# Patient Record
Sex: FEMALE | Race: WHITE | Employment: OTHER | URBAN - METROPOLITAN AREA
[De-identification: names, ages, dates, MRNs, and addresses within clinical notes are randomized per-mention and may not be internally consistent; named-entity substitution may affect disease eponyms.]

---

## 2019-04-10 ENCOUNTER — APPOINTMENT (OUTPATIENT)
Dept: CT IMAGING | Age: 59
DRG: 641 | End: 2019-04-10
Payer: MEDICARE

## 2019-04-10 ENCOUNTER — HOSPITAL ENCOUNTER (INPATIENT)
Age: 59
LOS: 2 days | Discharge: HOME | DRG: 641 | End: 2019-04-13
Attending: INTERNAL MEDICINE | Admitting: INTERNAL MEDICINE
Payer: MEDICARE

## 2019-04-10 ENCOUNTER — APPOINTMENT (OUTPATIENT)
Dept: GENERAL RADIOLOGY | Age: 59
DRG: 641 | End: 2019-04-10
Payer: MEDICARE

## 2019-04-10 DIAGNOSIS — K70.31 ALCOHOLIC CIRRHOSIS OF LIVER WITH ASCITES (HCC): ICD-10-CM

## 2019-04-10 DIAGNOSIS — E86.0 DEHYDRATION: ICD-10-CM

## 2019-04-10 DIAGNOSIS — E83.42 HYPOMAGNESEMIA: ICD-10-CM

## 2019-04-10 DIAGNOSIS — K92.1 BLOOD IN STOOL: ICD-10-CM

## 2019-04-10 DIAGNOSIS — F10.11 HISTORY OF ALCOHOL ABUSE: ICD-10-CM

## 2019-04-10 DIAGNOSIS — E87.1 HYPONATREMIA: Primary | ICD-10-CM

## 2019-04-10 LAB
A/G RATIO: 0.8 (ref 1.1–2.2)
ALBUMIN SERPL-MCNC: 3.3 G/DL (ref 3.4–5)
ALP BLD-CCNC: 155 U/L (ref 40–129)
ALT SERPL-CCNC: 29 U/L (ref 10–40)
ANION GAP SERPL CALCULATED.3IONS-SCNC: 15 MMOL/L (ref 3–16)
AST SERPL-CCNC: 142 U/L (ref 15–37)
BASOPHILS ABSOLUTE: 0 K/UL (ref 0–0.2)
BASOPHILS RELATIVE PERCENT: 0.8 %
BILIRUB SERPL-MCNC: 3.2 MG/DL (ref 0–1)
BUN BLDV-MCNC: 7 MG/DL (ref 7–20)
CALCIUM SERPL-MCNC: 8.2 MG/DL (ref 8.3–10.6)
CHLORIDE BLD-SCNC: 81 MMOL/L (ref 99–110)
CO2: 23 MMOL/L (ref 21–32)
CREAT SERPL-MCNC: <0.5 MG/DL (ref 0.6–1.1)
EOSINOPHILS ABSOLUTE: 0 K/UL (ref 0–0.6)
EOSINOPHILS RELATIVE PERCENT: 0.3 %
ETHANOL: 56 MG/DL (ref 0–0.08)
GFR AFRICAN AMERICAN: >60
GFR NON-AFRICAN AMERICAN: >60
GLOBULIN: 4.2 G/DL
GLUCOSE BLD-MCNC: 145 MG/DL (ref 70–99)
HCT VFR BLD CALC: 30.4 % (ref 36–48)
HEMOGLOBIN: 10.5 G/DL (ref 12–16)
LIPASE: 41 U/L (ref 13–60)
LYMPHOCYTES ABSOLUTE: 0.6 K/UL (ref 1–5.1)
LYMPHOCYTES RELATIVE PERCENT: 9.3 %
MAGNESIUM: 1.3 MG/DL (ref 1.8–2.4)
MCH RBC QN AUTO: 35.9 PG (ref 26–34)
MCHC RBC AUTO-ENTMCNC: 34.4 G/DL (ref 31–36)
MCV RBC AUTO: 104.2 FL (ref 80–100)
MONOCYTES ABSOLUTE: 0.3 K/UL (ref 0–1.3)
MONOCYTES RELATIVE PERCENT: 5.5 %
NEUTROPHILS ABSOLUTE: 5.2 K/UL (ref 1.7–7.7)
NEUTROPHILS RELATIVE PERCENT: 84.1 %
OCCULT BLOOD SCREENING: ABNORMAL
PDW BLD-RTO: 15.1 % (ref 12.4–15.4)
PLATELET # BLD: 143 K/UL (ref 135–450)
PMV BLD AUTO: 7.7 FL (ref 5–10.5)
POTASSIUM SERPL-SCNC: 3.9 MMOL/L (ref 3.5–5.1)
PRO-BNP: 93 PG/ML (ref 0–124)
RBC # BLD: 2.92 M/UL (ref 4–5.2)
SODIUM BLD-SCNC: 119 MMOL/L (ref 136–145)
SPECIMEN STATUS: NORMAL
TOTAL PROTEIN: 7.5 G/DL (ref 6.4–8.2)
TROPONIN: <0.01 NG/ML
WBC # BLD: 6.2 K/UL (ref 4–11)

## 2019-04-10 PROCEDURE — 80053 COMPREHEN METABOLIC PANEL: CPT

## 2019-04-10 PROCEDURE — 96367 TX/PROPH/DG ADDL SEQ IV INF: CPT

## 2019-04-10 PROCEDURE — 74177 CT ABD & PELVIS W/CONTRAST: CPT

## 2019-04-10 PROCEDURE — 84484 ASSAY OF TROPONIN QUANT: CPT

## 2019-04-10 PROCEDURE — 96365 THER/PROPH/DIAG IV INF INIT: CPT

## 2019-04-10 PROCEDURE — 6360000002 HC RX W HCPCS: Performed by: PHYSICIAN ASSISTANT

## 2019-04-10 PROCEDURE — 83735 ASSAY OF MAGNESIUM: CPT

## 2019-04-10 PROCEDURE — G0480 DRUG TEST DEF 1-7 CLASSES: HCPCS

## 2019-04-10 PROCEDURE — 36415 COLL VENOUS BLD VENIPUNCTURE: CPT

## 2019-04-10 PROCEDURE — 2500000003 HC RX 250 WO HCPCS: Performed by: PHYSICIAN ASSISTANT

## 2019-04-10 PROCEDURE — 93005 ELECTROCARDIOGRAM TRACING: CPT | Performed by: INTERNAL MEDICINE

## 2019-04-10 PROCEDURE — 83690 ASSAY OF LIPASE: CPT

## 2019-04-10 PROCEDURE — 6360000004 HC RX CONTRAST MEDICATION: Performed by: PHYSICIAN ASSISTANT

## 2019-04-10 PROCEDURE — 2580000003 HC RX 258: Performed by: PHYSICIAN ASSISTANT

## 2019-04-10 PROCEDURE — 99285 EMERGENCY DEPT VISIT HI MDM: CPT

## 2019-04-10 PROCEDURE — G0328 FECAL BLOOD SCRN IMMUNOASSAY: HCPCS

## 2019-04-10 PROCEDURE — 71046 X-RAY EXAM CHEST 2 VIEWS: CPT

## 2019-04-10 PROCEDURE — 85025 COMPLETE CBC W/AUTO DIFF WBC: CPT

## 2019-04-10 PROCEDURE — 96375 TX/PRO/DX INJ NEW DRUG ADDON: CPT

## 2019-04-10 PROCEDURE — 83880 ASSAY OF NATRIURETIC PEPTIDE: CPT

## 2019-04-10 RX ORDER — ONDANSETRON 2 MG/ML
4 INJECTION INTRAMUSCULAR; INTRAVENOUS ONCE
Status: COMPLETED | OUTPATIENT
Start: 2019-04-10 | End: 2019-04-10

## 2019-04-10 RX ORDER — 0.9 % SODIUM CHLORIDE 0.9 %
1000 INTRAVENOUS SOLUTION INTRAVENOUS ONCE
Status: COMPLETED | OUTPATIENT
Start: 2019-04-11 | End: 2019-04-11

## 2019-04-10 RX ORDER — MAGNESIUM SULFATE IN WATER 40 MG/ML
2 INJECTION, SOLUTION INTRAVENOUS ONCE
Status: COMPLETED | OUTPATIENT
Start: 2019-04-10 | End: 2019-04-11

## 2019-04-10 RX ADMIN — FOLIC ACID: 5 INJECTION, SOLUTION INTRAMUSCULAR; INTRAVENOUS; SUBCUTANEOUS at 23:06

## 2019-04-10 RX ADMIN — FAMOTIDINE 20 MG: 10 INJECTION, SOLUTION INTRAVENOUS at 23:48

## 2019-04-10 RX ADMIN — ONDANSETRON 4 MG: 2 INJECTION INTRAMUSCULAR; INTRAVENOUS at 22:22

## 2019-04-10 RX ADMIN — MAGNESIUM SULFATE HEPTAHYDRATE 2 G: 40 INJECTION, SOLUTION INTRAVENOUS at 23:48

## 2019-04-10 RX ADMIN — IOPAMIDOL 75 ML: 755 INJECTION, SOLUTION INTRAVENOUS at 22:30

## 2019-04-10 ASSESSMENT — PAIN DESCRIPTION - LOCATION: LOCATION: ABDOMEN

## 2019-04-10 ASSESSMENT — PAIN SCALES - GENERAL: PAINLEVEL_OUTOF10: 8

## 2019-04-10 ASSESSMENT — PAIN DESCRIPTION - PAIN TYPE: TYPE: ACUTE PAIN

## 2019-04-11 LAB
AMMONIA: 43 UMOL/L (ref 11–51)
ANION GAP SERPL CALCULATED.3IONS-SCNC: 11 MMOL/L (ref 3–16)
ANION GAP SERPL CALCULATED.3IONS-SCNC: 12 MMOL/L (ref 3–16)
BILIRUBIN URINE: NEGATIVE
BLOOD, URINE: ABNORMAL
BUN BLDV-MCNC: 6 MG/DL (ref 7–20)
BUN BLDV-MCNC: 6 MG/DL (ref 7–20)
CALCIUM SERPL-MCNC: 7.4 MG/DL (ref 8.3–10.6)
CALCIUM SERPL-MCNC: 7.6 MG/DL (ref 8.3–10.6)
CHLORIDE BLD-SCNC: 85 MMOL/L (ref 99–110)
CHLORIDE BLD-SCNC: 85 MMOL/L (ref 99–110)
CLARITY: CLEAR
CO2: 25 MMOL/L (ref 21–32)
CO2: 25 MMOL/L (ref 21–32)
COLOR: YELLOW
CREAT SERPL-MCNC: <0.5 MG/DL (ref 0.6–1.1)
CREAT SERPL-MCNC: <0.5 MG/DL (ref 0.6–1.1)
EKG ATRIAL RATE: 113 BPM
EKG DIAGNOSIS: NORMAL
EKG P AXIS: 60 DEGREES
EKG P-R INTERVAL: 138 MS
EKG Q-T INTERVAL: 340 MS
EKG QRS DURATION: 70 MS
EKG QTC CALCULATION (BAZETT): 466 MS
EKG R AXIS: 46 DEGREES
EKG T AXIS: 31 DEGREES
EKG VENTRICULAR RATE: 113 BPM
EPITHELIAL CELLS, UA: ABNORMAL /HPF
GFR AFRICAN AMERICAN: >60
GFR AFRICAN AMERICAN: >60
GFR NON-AFRICAN AMERICAN: >60
GFR NON-AFRICAN AMERICAN: >60
GLUCOSE BLD-MCNC: 103 MG/DL (ref 70–99)
GLUCOSE BLD-MCNC: 115 MG/DL (ref 70–99)
GLUCOSE URINE: NEGATIVE MG/DL
HCT VFR BLD CALC: 24.9 % (ref 36–48)
HEMOGLOBIN: 8.8 G/DL (ref 12–16)
KETONES, URINE: NEGATIVE MG/DL
LEUKOCYTE ESTERASE, URINE: NEGATIVE
MAGNESIUM: 1.5 MG/DL (ref 1.8–2.4)
MCH RBC QN AUTO: 36.6 PG (ref 26–34)
MCHC RBC AUTO-ENTMCNC: 35.4 G/DL (ref 31–36)
MCV RBC AUTO: 103.4 FL (ref 80–100)
MICROSCOPIC EXAMINATION: YES
NITRITE, URINE: NEGATIVE
PDW BLD-RTO: 15.4 % (ref 12.4–15.4)
PH UA: 6 (ref 5–8)
PLATELET # BLD: 127 K/UL (ref 135–450)
PMV BLD AUTO: 8 FL (ref 5–10.5)
POTASSIUM REFLEX MAGNESIUM: 3.4 MMOL/L (ref 3.5–5.1)
POTASSIUM SERPL-SCNC: 3.4 MMOL/L (ref 3.5–5.1)
POTASSIUM SERPL-SCNC: 3.5 MMOL/L (ref 3.5–5.1)
PROTEIN UA: NEGATIVE MG/DL
RBC # BLD: 2.41 M/UL (ref 4–5.2)
RBC UA: ABNORMAL /HPF (ref 0–2)
SODIUM BLD-SCNC: 121 MMOL/L (ref 136–145)
SODIUM BLD-SCNC: 122 MMOL/L (ref 136–145)
SODIUM URINE: <20 MMOL/L
SPECIFIC GRAVITY UA: 1.01 (ref 1–1.03)
URINE TYPE: ABNORMAL
UROBILINOGEN, URINE: 2 E.U./DL
WBC # BLD: 4.6 K/UL (ref 4–11)
WBC UA: ABNORMAL /HPF (ref 0–5)

## 2019-04-11 PROCEDURE — 6370000000 HC RX 637 (ALT 250 FOR IP): Performed by: INTERNAL MEDICINE

## 2019-04-11 PROCEDURE — 1200000000 HC SEMI PRIVATE

## 2019-04-11 PROCEDURE — 6360000002 HC RX W HCPCS: Performed by: PHYSICIAN ASSISTANT

## 2019-04-11 PROCEDURE — 96366 THER/PROPH/DIAG IV INF ADDON: CPT

## 2019-04-11 PROCEDURE — 2580000003 HC RX 258: Performed by: INTERNAL MEDICINE

## 2019-04-11 PROCEDURE — 82140 ASSAY OF AMMONIA: CPT

## 2019-04-11 PROCEDURE — 36415 COLL VENOUS BLD VENIPUNCTURE: CPT

## 2019-04-11 PROCEDURE — 2700000000 HC OXYGEN THERAPY PER DAY

## 2019-04-11 PROCEDURE — 84300 ASSAY OF URINE SODIUM: CPT

## 2019-04-11 PROCEDURE — 80048 BASIC METABOLIC PNL TOTAL CA: CPT

## 2019-04-11 PROCEDURE — 2580000003 HC RX 258: Performed by: PHYSICIAN ASSISTANT

## 2019-04-11 PROCEDURE — 83735 ASSAY OF MAGNESIUM: CPT

## 2019-04-11 PROCEDURE — 83935 ASSAY OF URINE OSMOLALITY: CPT

## 2019-04-11 PROCEDURE — 81001 URINALYSIS AUTO W/SCOPE: CPT

## 2019-04-11 PROCEDURE — 6360000002 HC RX W HCPCS: Performed by: INTERNAL MEDICINE

## 2019-04-11 PROCEDURE — C9113 INJ PANTOPRAZOLE SODIUM, VIA: HCPCS | Performed by: PHYSICIAN ASSISTANT

## 2019-04-11 PROCEDURE — 93010 ELECTROCARDIOGRAM REPORT: CPT | Performed by: INTERNAL MEDICINE

## 2019-04-11 PROCEDURE — 85027 COMPLETE CBC AUTOMATED: CPT

## 2019-04-11 PROCEDURE — 94761 N-INVAS EAR/PLS OXIMETRY MLT: CPT

## 2019-04-11 PROCEDURE — 99406 BEHAV CHNG SMOKING 3-10 MIN: CPT

## 2019-04-11 PROCEDURE — 94664 DEMO&/EVAL PT USE INHALER: CPT

## 2019-04-11 RX ORDER — SODIUM CHLORIDE 9 MG/ML
INJECTION, SOLUTION INTRAVENOUS CONTINUOUS
Status: DISCONTINUED | OUTPATIENT
Start: 2019-04-11 | End: 2019-04-11

## 2019-04-11 RX ORDER — SODIUM CHLORIDE 0.9 % (FLUSH) 0.9 %
10 SYRINGE (ML) INJECTION EVERY 12 HOURS SCHEDULED
Status: DISCONTINUED | OUTPATIENT
Start: 2019-04-11 | End: 2019-04-13 | Stop reason: HOSPADM

## 2019-04-11 RX ORDER — ALBUTEROL SULFATE 90 UG/1
2 AEROSOL, METERED RESPIRATORY (INHALATION) EVERY 6 HOURS PRN
COMMUNITY

## 2019-04-11 RX ORDER — HYDROCODONE BITARTRATE AND ACETAMINOPHEN 5; 325 MG/1; MG/1
1 TABLET ORAL EVERY 4 HOURS PRN
Status: DISCONTINUED | OUTPATIENT
Start: 2019-04-11 | End: 2019-04-13 | Stop reason: HOSPADM

## 2019-04-11 RX ORDER — SODIUM CHLORIDE 0.9 % (FLUSH) 0.9 %
10 SYRINGE (ML) INJECTION PRN
Status: DISCONTINUED | OUTPATIENT
Start: 2019-04-11 | End: 2019-04-13 | Stop reason: HOSPADM

## 2019-04-11 RX ORDER — ONDANSETRON 2 MG/ML
4 INJECTION INTRAMUSCULAR; INTRAVENOUS EVERY 6 HOURS PRN
Status: DISCONTINUED | OUTPATIENT
Start: 2019-04-11 | End: 2019-04-13 | Stop reason: HOSPADM

## 2019-04-11 RX ORDER — 0.9 % SODIUM CHLORIDE 0.9 %
10 VIAL (ML) INJECTION PRN
Status: DISCONTINUED | OUTPATIENT
Start: 2019-04-11 | End: 2019-04-13 | Stop reason: HOSPADM

## 2019-04-11 RX ORDER — OCTREOTIDE ACETATE 100 UG/ML
50 INJECTION, SOLUTION INTRAVENOUS; SUBCUTANEOUS ONCE
Status: COMPLETED | OUTPATIENT
Start: 2019-04-11 | End: 2019-04-11

## 2019-04-11 RX ORDER — POTASSIUM CHLORIDE 20 MEQ/1
20 TABLET, EXTENDED RELEASE ORAL 2 TIMES DAILY
Status: COMPLETED | OUTPATIENT
Start: 2019-04-11 | End: 2019-04-12

## 2019-04-11 RX ADMIN — HYDROCODONE BITARTRATE AND ACETAMINOPHEN 1 TABLET: 5; 325 TABLET ORAL at 20:19

## 2019-04-11 RX ADMIN — SODIUM CHLORIDE 8 MG/HR: 9 INJECTION, SOLUTION INTRAVENOUS at 16:22

## 2019-04-11 RX ADMIN — POTASSIUM CHLORIDE 20 MEQ: 20 TABLET, EXTENDED RELEASE ORAL at 20:19

## 2019-04-11 RX ADMIN — HYDROCODONE BITARTRATE AND ACETAMINOPHEN 1 TABLET: 5; 325 TABLET ORAL at 08:49

## 2019-04-11 RX ADMIN — OCTREOTIDE ACETATE 50 MCG: 100 INJECTION, SOLUTION INTRAVENOUS; SUBCUTANEOUS at 06:02

## 2019-04-11 RX ADMIN — OCTREOTIDE ACETATE 50 MCG/HR: 500 INJECTION, SOLUTION INTRAVENOUS; SUBCUTANEOUS at 16:22

## 2019-04-11 RX ADMIN — POTASSIUM CHLORIDE 20 MEQ: 20 TABLET, EXTENDED RELEASE ORAL at 11:59

## 2019-04-11 RX ADMIN — HYDROCODONE BITARTRATE AND ACETAMINOPHEN 1 TABLET: 5; 325 TABLET ORAL at 04:28

## 2019-04-11 RX ADMIN — SODIUM CHLORIDE 1000 ML: 9 INJECTION, SOLUTION INTRAVENOUS at 01:56

## 2019-04-11 RX ADMIN — SODIUM CHLORIDE: 9 INJECTION, SOLUTION INTRAVENOUS at 04:28

## 2019-04-11 RX ADMIN — SODIUM CHLORIDE 8 MG/HR: 9 INJECTION, SOLUTION INTRAVENOUS at 02:27

## 2019-04-11 RX ADMIN — HYDROCODONE BITARTRATE AND ACETAMINOPHEN 1 TABLET: 5; 325 TABLET ORAL at 13:29

## 2019-04-11 RX ADMIN — SODIUM CHLORIDE 80 MG: 9 INJECTION, SOLUTION INTRAVENOUS at 01:58

## 2019-04-11 RX ADMIN — CEFTRIAXONE SODIUM 1 G: 1 INJECTION, POWDER, FOR SOLUTION INTRAMUSCULAR; INTRAVENOUS at 06:29

## 2019-04-11 RX ADMIN — Medication 10 ML: at 08:49

## 2019-04-11 RX ADMIN — OCTREOTIDE ACETATE 50 MCG/HR: 500 INJECTION, SOLUTION INTRAVENOUS; SUBCUTANEOUS at 06:03

## 2019-04-11 RX ADMIN — ONDANSETRON 4 MG: 2 INJECTION INTRAMUSCULAR; INTRAVENOUS at 04:33

## 2019-04-11 ASSESSMENT — PAIN DESCRIPTION - PROGRESSION
CLINICAL_PROGRESSION: NOT CHANGED

## 2019-04-11 ASSESSMENT — PAIN SCALES - GENERAL
PAINLEVEL_OUTOF10: 9
PAINLEVEL_OUTOF10: 5
PAINLEVEL_OUTOF10: 7
PAINLEVEL_OUTOF10: 8
PAINLEVEL_OUTOF10: 9
PAINLEVEL_OUTOF10: 8

## 2019-04-11 ASSESSMENT — PAIN DESCRIPTION - LOCATION
LOCATION: BACK;ABDOMEN

## 2019-04-11 ASSESSMENT — PAIN DESCRIPTION - PAIN TYPE
TYPE: ACUTE PAIN

## 2019-04-11 ASSESSMENT — PAIN DESCRIPTION - DESCRIPTORS
DESCRIPTORS: ACHING;CRAMPING;DISCOMFORT
DESCRIPTORS: ACHING;CRAMPING

## 2019-04-11 ASSESSMENT — PAIN DESCRIPTION - FREQUENCY
FREQUENCY: CONTINUOUS
FREQUENCY: CONTINUOUS

## 2019-04-11 ASSESSMENT — PAIN DESCRIPTION - ONSET
ONSET: ON-GOING
ONSET: ON-GOING

## 2019-04-11 ASSESSMENT — PAIN - FUNCTIONAL ASSESSMENT: PAIN_FUNCTIONAL_ASSESSMENT: 0-10

## 2019-04-11 NOTE — CONSULTS
Thank you to requesting provider: Dr. Olivia Closs, for asking us to see Lizeth Becker  Reason for consultation:   Hyponatremia   Chief Complaint:  Abdominal pain    History of Presenting Illness      59 y/o female admitted with abdominal pain. She reports this has been going on for 6 weeks and she had not been able to keep much down. She has gained weight and has abdominal distention. She says she is not aware that she has any liver disease. We have been asked to see her for hyponatremia. Review of labs indicates that her sodium has been intermittently low for many years, mostly in the 125 to 131 range. She does have edema. Has been having black diarrhea as well. Now her sodium is 122, she is alert.          Past Medical/Surgical History      Active Ambulatory Problems     Diagnosis Date Noted    Acute pancreatitis 04/03/2014    N&V (nausea and vomiting) 04/04/2014    Abdominal pain 04/04/2014    Transaminitis 04/05/2014    Rash, drug 04/05/2014    Hyponatremia 04/05/2014    Chronic hepatitis C virus infection ([facility] [address] 11/22/2015    Hypertension 10/02/2013    Tobacco dependence syndrome 10/02/2013     Resolved Ambulatory Problems     Diagnosis Date Noted    No Resolved Ambulatory Problems     Past Medical History:   Diagnosis Date    Abdominal pain     Carpal tunnel syndrome     Hepatitis C     Hepatitis C     Hypertension     Pancreatitis     Scoliosis     SVT (supraventricular tachycardia) (HCC)          Review of Systems     Constitutional:  No weight loss, no fever/chills  Eyes:  No eye pain, no eye redness  Cardiovascular:  No chest pain, + worsening of edema  Respiratory:  No hemoptysis, no stridor  Gastrointestinal:  + blood in stool, + n/v, + diarrhea  Genitoruinary:  No hematuria, no difficulty with urination  Musculoskeletal:  No joint swelling, no redness  Integumentary:  No Rash, no itching   Neurological:  No focal weakness, No new sensory deficit  Psychiatric:  No depression, no confusion  Endocrine:  No polyuria, no polydipsia     Medications      Reviewed in EMR     Allergies     Lorazepam and Morphine      Family History       Negative for Kidney Disease    Social History      Social History     Socioeconomic History    Marital status: Single     Spouse name: None    Number of children: None    Years of education: None    Highest education level: None   Occupational History    None   Social Needs    Financial resource strain: None    Food insecurity:     Worry: None     Inability: None    Transportation needs:     Medical: None     Non-medical: None   Tobacco Use    Smoking status: Current Every Day Smoker     Packs/day: 0.25     Years: 1.00     Pack years: 0.25     Types: Cigarettes    Tobacco comment: only smokes 4 a day   Substance and Sexual Activity    Alcohol use: Yes     Comment: 3 drinks per day    Drug use: No     Frequency: 3.0 times per week    Sexual activity: None   Lifestyle    Physical activity:     Days per week: None     Minutes per session: None    Stress: None   Relationships    Social connections:     Talks on phone: None     Gets together: None     Attends [employer] service: None     Active member of club or organization: None     Attends meetings of clubs or organizations: None     Relationship status: None    Intimate partner violence:     Fear of current or ex partner: None     Emotionally abused: None     Physically abused: None     Forced sexual activity: None   Other Topics Concern    None   Social History Narrative    None       Physical Exam     Blood pressure (!) 82/49, pulse 108, temperature 98.1 °F (36.7 °C), temperature source Oral, resp. rate 16, height 5' 5\" (1.651 m), weight 118 lb (53.5 kg), SpO2 98 %.     General:  NAD, A+Ox3, ill-appearing  HEENT:  PERRL, EOMI  Neck:  Supple, normal range of movement  Chest:  CTAB, good respiratory effort, good air movement  CV:  Regular, no rub   Abdomen:  NTND, soft, +BS, no hepatosplenomegaly, +

## 2019-04-11 NOTE — PROGRESS NOTES
Patient admitted to room 212 from Emergency Department. Patient oriented to room, call light, bed rails, phone, lights and bathroom. Patient instructed about the schedule of the day including: vital sign frequency, lab draws, possible tests, frequency of MD and staff rounds, including RN/MD rounding together at bedside, daily weights, and I &O's. Patient instructed about prescribed diet, how to use 8MENU, and television. Bed alarm in place, patient aware of placement and reason. Telemetry box #68 in place, patient aware of placement and reason. Bed locked, in lowest position, side rails up 2/4, call light within reach. Will continue to monitor.

## 2019-04-11 NOTE — PROGRESS NOTES
Spoke with GI consulting physician Gustavo Quintana who advised that patient should be NPO in preparation for EGD. Discussed new medication orders which include Rocephin IV q24, octreotide bolus, octreotide continuous infusion.

## 2019-04-11 NOTE — PROGRESS NOTES
Perfect Serve to Dr. Argueta Commander:    Should GI also be consulted? N/V with occult positive. H/H 10.5/30.4 Please advise. Thank you.     Electronically signed by Joel Vargas RN on 4/11/2019 at 2:23 AM     Response: GI consult ordered    Electronically signed by Joel Vargas RN on 4/11/2019 at 3:03 AM

## 2019-04-11 NOTE — CARE COORDINATION
CASE MANAGEMENT INITIAL ASSESSMENT      Reviewed chart and met with patient today, re: discharge planning   Explained Case Management role/services. Family present: motherGayle   Primary contact information: motherGayle [phone]    Admit date/status: 4/10/19 Inpatient  Diagnosis: Hyponatremia    Insurance: Medicare, Medicaid secondary   Precert required for SNF - N        3 night stay required - Y    Living arrangements, Adls, care needs, prior to admission: Pt lives with parents, her daughter, and her four grandchildren. Independent although has debilitating back pain, usually drinks 2-3 beers/night, does not have pain medicine. Transportation: pt to arrange with family     PT/OT recs: not ordered    Barriers to discharge: none noted    Plan/comments: Pt's mother denies any discharge needs for pt. Please notify DCP if needs arise.   BRIJESH Arreola-RN

## 2019-04-11 NOTE — PLAN OF CARE
Problem: Falls - Risk of:  Goal: Will remain free from falls  Description  Will remain free from falls  Outcome: Ongoing  Note:   Pt will call out to staff before getting up and ambulating by self. Alarm in place, bed locked and in lowest position, fall socks on and in place. Will continue to monitor.

## 2019-04-11 NOTE — H&P
Hospital Medicine History & Physical      PCP: Landry Ellington MD    Date of Admission: 4/10/2019    Date of Service: Pt seen/examined on 04/11/19   and Admitted to Inpatient with expected LOS greater than two midnights due to medical therapy. Chief Complaint   Patient presents with    Illness     \"Has been sick for six weeks. \"  States she feel very weak and has been having black-colored diarrhea. States her feet and abdomen are swollen. C/O abd/back pain. History Of Present Illness: 60 y.o. female who hasn't been following with her PCP for the last 1.5 year  presented to [facility] ED  with her mom with C/o 6 week  history of upper abdominal pain with nausea and vomiting. Patient reports that she drinks approximately 6 beers per day. States that for past 6 weeks she has had some upper abdominal discomfort with nausea and vomiting. For the last few days her Symptoms have worsened and so presented to ED for further E/M . Denies headache, lightheadedness, dizziness or confusion. Upper abdominal pain worsening. She has N/V x 1 but intermittently had diarrhea . Also had bright some  coffee-ground emesis. Has had some diarrhea which has been black. No constipation. Denies fevers chills. No chest pain shortness of breath. No urinary symptoms. No vaginal complaints. Reports that she has no history of alcohol withdrawal.  She has not however attempted cessation in many years. No history of cirrhosis. Mother reports that she does appear yellow. ED Course : Upon arrival to ED , she was found to be tachycardic and normotensive  . Labs showed severe hyponatremia with Na -119 and hypomagnesemia . Her Hb 10.5 with macrocytosis and positive Occult blood . CT A/P s/o Hepatic Cirrhosis with Moderate ascites w/o splenomegaly . She received 1 L fluid bolus in ED and was admitted to the hospital for further E/M. Nephrology and GI were consulted .      Upon evaluation by me this AM, Patient was already evaluated by Nephrology and fluids were D/alberto and recommended to consider Tolvaptan PRN . Gi evaluated with plans for possible EGD once hyponatremia improves . She was also  On Octreotide and Protonix gtt     Past Medical History:      Diagnosis Date    Abdominal pain     in ER Wed. 11/2  for Abdominal pain stool/showed blood.  Carpal tunnel syndrome     Hepatitis C     Hepatitis C     Hypertension     Pancreatitis     Scoliosis     SVT (supraventricular tachycardia) (HCC)        Past Surgical History:        Procedure Laterality Date    APPENDECTOMY      BACK SURGERY      r/t scoliosis    COLONOSCOPY      COLONOSCOPY  11/11    polyp    COLONOSCOPY  27 Jan 2016    polyps    ENDOSCOPIC ULTRASOUND (UPPER)  08/24/2016    Enlarged Pancreas    EYE SURGERY Right 2/5/15    Cataract removal    TUBAL LIGATION      UPPER GASTROINTESTINAL ENDOSCOPY  11/4/11    bx distal esophagus       Medications Prior to Admission:    Prior to Admission medications    Medication Sig Start Date End Date Taking? Authorizing Provider   albuterol sulfate  (90 Base) MCG/ACT inhaler Inhale 2 puffs into the lungs every 6 hours as needed for Wheezing   Yes Historical Provider, MD   Multiple Vitamins-Minerals (THERAPEUTIC MULTIVITAMIN-MINERALS) tablet Take 1 tablet by mouth daily. Historical Provider, MD       Allergies:  Lorazepam and Morphine    Social History:    The patient currently lives with her mother and is independent of IADLs     TOBACCO:   reports that she has been smoking cigarettes. She has a 0.25 pack-year smoking history. She does not have any smokeless tobacco history on file. ETOH:   reports that she drinks alcohol. Family History:     Reviewed in detail and negative for DM, CAD, Cancer, CVA.  Positive as follows:        Problem Relation Age of Onset    Cancer Father         small cell    Cancer Brother         small cell    Arthritis Mother     High Blood Pressure Mother REVIEW OF SYSTEMS:   Pertinent positives as noted in the HPI. All other systems reviewed and negative. PHYSICAL EXAM PERFORMED:  BP (!) 82/49 Comment: MD aware  Pulse 108   Temp 98.1 °F (36.7 °C) (Oral)   Resp 16   Ht 5' 5\" (1.651 m)   Wt 118 lb (53.5 kg)   SpO2 98%   BMI 19.64 kg/m²     General appearance:  No apparent distress, appears stated age and cooperative. HEENT:  Normal cephalic, atraumatic without obvious deformity. Pupils equal, round, and reactive to light. Extra ocular muscles intact. Conjunctivae/corneas clear. Neck: Supple, with full range of motion. No jugular venous distention. Trachea midline. Respiratory:  Normal respiratory effort. Clear to auscultation, bilaterally without Rales/Wheezes/Rhonchi. Cardiovascular:  Regular rate and rhythm with normal S1/S2 without murmurs, rubs or gallops. Abdomen: Soft, mildly tender, mildly -distended with normal bowel sounds. Musculoskeletal:  No clubbing, cyanosis or edema bilaterally. Full range of motion without deformity. Skin: Skin color, texture, turgor normal.  No rashes or lesions. Neurologic:  Neurovascularly intact without any focal sensory/motor deficits. Cranial nerves: II-XII intact, grossly non-focal.  Psychiatric:  Alert and oriented, thought content appropriate, normal insight  Capillary Refill: Brisk,< 3 seconds   Peripheral Pulses: +2 palpable, equal bilaterally       Labs:   Recent Labs     04/10/19  2017 04/11/19  0759   WBC 6.2 4.6   HGB 10.5* 8.8*   HCT 30.4* 24.9*    127*     Recent Labs     04/10/19  2017 04/11/19  0800   * 122*   K 3.9 3.4*  3.4*   CL 81* 85*   CO2 23 25   BUN 7 6*   CREATININE <0.5* <0.5*   CALCIUM 8.2* 7.4*     Recent Labs     04/10/19  2017   *   ALT 29   BILITOT 3.2*   ALKPHOS 155*     No results for input(s): INR in the last 72 hours.   Recent Labs     04/10/19  2017   TROPONINI <0.01       Urinalysis:    Lab Results   Component Value Date    NITRU Negative 04/11/2019 WBCUA 0-2 04/11/2019    RBCUA 3-5 04/11/2019    BLOODU SMALL 04/11/2019    SPECGRAV 1.010 04/11/2019    GLUCOSEU Negative 04/11/2019       EKG:  I have reviewed the EKG with the following interpretation: ST with PVCs, non specific ST/T changes     Radiology:    I have reviewed the Imaging  with the following interpretation:   CT ABDOMEN PELVIS W IV CONTRAST Additional Contrast? None   Final Result   1. Liver findings suggest cirrhosis. 2. Moderate ascites without splenomegaly. 3. Colonic wall thickening is nonspecific in the setting of cirrhosis and   ascites though colitis must be considered. XR CHEST STANDARD (2 VW)   Final Result   Linear opacification seen at the lung bases bilaterally could represent   atelectasis or pneumonia      Hiatal hernia again noted               Active Hospital Problems    Diagnosis Date Noted    Hyponatremia [E87.1] 04/05/2014       ASSESSMENT/PLAN:  1. Acute on Chronic Hyponatremia likely due to [facility] [place] potomania in the setting of Alcoholic Liver disease   - Na 119 on admission ; Currently upto 122 this AM after IV fluids ; Nephrology evaluated and recommended D/c IV fludis and monitor for now . Consider tolvaptan PRN . 2. Anemia in the setting of Alcoholic Liver disease and Upper GI bleed   - FOBT positive ; Started on Octreotide and Protonix gtt in ED - Continue   - Monitor H/H and plan for PRBC transfusion for Hb < 7   - GI consulted from ED -Evaluated with plans to do EGD tomorrow     3. Abdominal Pain with Moderates Ascites on CT - Normal WBC and no signs of infection - Will d/w GI and arrange for US guided Paracentesis . On exam abdomen not significantly distended  - On Empiric Rocephin     4.  Chronic Low back pain with Hx of Hardware in her back - On Norco Prn - Continue       DVT Prophylaxis: SCD given GI bleed   Diet: DIET CLEAR LIQUID;  Diet NPO, After Midnight  Code Status: Full Code    PT/OT Eval Status: Not yet ordered     Dispo - Anticipate > 2 MN stay in the hospital      Keysha Culp MD    The note was completed using Dragon -speech recognition software & EMR  . Every effort was made to ensure accuracy; however, inadvertent computerized transcription errors may be present. Thank you Bill Gannon MD for the opportunity to be involved in this patient's care. If you have any questions or concerns please feel free to contact me at [phone].

## 2019-04-11 NOTE — PROGRESS NOTES
Perfect Serve to Dr. Pretty Khan:    Please call. Need to clarify orders regarding sodium levels. Electronically signed by Mikayla Carranza RN on 4/11/2019 at 2:09 AM     Response: Continue to run 1000 mL/hr of 1LNS bolus. Nephrology to be consulted. Norco ordered for pain management.     Electronically signed by Mikayla Carranza RN on 4/11/2019 at 2:23 AM

## 2019-04-11 NOTE — ED NOTES
Pt to er with complaints of abdominal pain for one week. Ivania Dubin states she has had vomiting and dark colored diarrhea. . Pt's abdomin is noticablely swollen and pt states it has been that way for a few days. . Pt states that she does drink a few beers everyday and has a history of pancreatitis         Rayma Leventhal, RN  04/10/19 2055

## 2019-04-11 NOTE — PROGRESS NOTES
4 Eyes Skin Assessment     The patient is being assess for  Admission    I agree that 2 RN's have performed a thorough Head to Toe Skin Assessment on the patient. ALL assessment sites listed below have been assessed. Areas assessed by both nurses: Ganesh Danger  [x]   Head, Face, and Ears   [x]   Shoulders, Back, and Chest  [x]   Arms, Elbows, and Hands   [x]   Coccyx, Sacrum, and Ischum  [x]   Legs, Feet, and Heels        Does the Patient have Skin Breakdown?   No         Kuldeep Prevention initiated:  No   Wound Care Orders initiated:  No      [facility] nurse consulted for Pressure Injury (Stage 3,4, Unstageable, DTI, NWPT, and Complex wounds):  No      Nurse 1 eSignature: Electronically signed by Floridalma Ramírez RN on 4/11/19 at 2:02 AM    **SHARE this note so that the co-signing nurse is able to place an eSignature**    Nurse 2 eSignature: Electronically signed by Marley Dailey RN on 4/11/19 at 4:44 AM

## 2019-04-11 NOTE — CONSULTS
Gastroenterology Consult Note    Patient:   Zoraida Castro   YOB: 1960   Facility:   [facility]   Referring/PCP: Alicia Javier MD  Date:     4/11/2019  Consultant:   Melody Bertrand MD    Subjective: This 60 y.o. female was admitted 4/10/2019 with a diagnosis of \"Hyponatremia [E87.1]  Hyponatremia [E87. 1]\" and is seen in consultation regarding \"N/V\". Information was obtained from interview of  the patient, examination of the patient, and review of records. I did  update the past medical, surgical, social and / or family history. N/V moderate in UGI tact for 2 weeks assoc w anemia    Current status  Present  Diet Order: Diet NPO Effective Now and she is not tolerating diet. Recently, she has experienced no abdominal  Pain and she has not required Intravenous narcotic analgesics. The patient has also experienced no constipation, diarrhea, fever, hematochezia and melena      Prior to Admission medications    Medication Sig Start Date End Date Taking? Authorizing Provider   albuterol sulfate  (90 Base) MCG/ACT inhaler Inhale 2 puffs into the lungs every 6 hours as needed for Wheezing   Yes Historical Provider, MD   Multiple Vitamins-Minerals (THERAPEUTIC MULTIVITAMIN-MINERALS) tablet Take 1 tablet by mouth daily. Historical Provider, MD      Scheduled Medications:    sodium chloride flush  10 mL Intravenous 2 times per day    cefTRIAXone (ROCEPHIN) IV  1 g Intravenous Q24H     Infusions:    sodium chloride 75 mL/hr at 04/11/19 0428    octreotide (SANDOSTATIN) infusion 50 mcg/hr (04/11/19 0603)    pantoprozole (PROTONIX) infusion 8 mg/hr (04/11/19 0227)     PRN Medications: sodium chloride flush, magnesium hydroxide, ondansetron, HYDROcodone 5 mg - acetaminophen, sodium chloride (PF)  Allergies: Allergies   Allergen Reactions    Lorazepam     Morphine Itching       Past Medical History:   Diagnosis Date    Abdominal pain     in ER Wed.  11/2  for Abdominal pain stool/showed blood.  Carpal tunnel syndrome     Hepatitis C     Hepatitis C     Hypertension     Pancreatitis     Scoliosis     SVT (supraventricular tachycardia) (HCC)      Past Surgical History:   Procedure Laterality Date    APPENDECTOMY      BACK SURGERY      r/t scoliosis    COLONOSCOPY      COLONOSCOPY      polyp    COLONOSCOPY  2016    polyps    ENDOSCOPIC ULTRASOUND (UPPER)  2016    Enlarged Pancreas    EYE SURGERY Right 2/5/15    Cataract removal    TUBAL LIGATION      UPPER GASTROINTESTINAL ENDOSCOPY  11    bx distal esophagus       Social:   Social History     Tobacco Use    Smoking status: Current Every Day Smoker     Packs/day: 0.25     Years: 1.00     Pack years: 0.25     Types: Cigarettes    Tobacco comment: only smokes 4 a day   Substance Use Topics    Alcohol use: Yes     Comment: 3 drinks per day     Family:   Family History   Problem Relation Age of Onset    Cancer Father         small cell    Cancer Brother         small cell    Arthritis Mother     High Blood Pressure Mother        ROS: Pertinent items are noted in HPI.     Objective:   Vital Signs:  Temp (24hrs), Av.4 °F (36.9 °C), Min:97.9 °F (36.6 °C), Max:99.1 °F (61.8 °C)     Systolic (65IBB), TRV:851 , Min:82 , MND:918      Diastolic (34OYP), TIQ:54, Min:49, Max:80     Pulse  Av.6  Min: 95  Max: 125  BP (!) 82/49 Comment: MD aware  Pulse 108   Temp 98.1 °F (36.7 °C) (Oral)   Resp 16   Ht 5' 5\" (1.651 m)   Wt 118 lb (53.5 kg)   SpO2 98%   BMI 19.64 kg/m²      Physical Exam:   BP (!) 82/49 Comment: MD aware  Pulse 108   Temp 98.1 °F (36.7 °C) (Oral)   Resp 16   Ht 5' 5\" (1.651 m)   Wt 118 lb (53.5 kg)   SpO2 98%   BMI 19.64 kg/m²   General appearance: alert, appears stated age and cooperative  Lungs: clear to auscultation bilaterally  Chest wall: no tenderness  Heart: regular rate and rhythm, S1, S2 normal, no murmur, click, rub or gallop  Abdomen: soft,

## 2019-04-11 NOTE — ED PROVIDER NOTES
[facility] Emergency Department    CHIEF COMPLAINT  Illness (\"Has been sick for six weeks. \"  States she feel very weak and has been having black-colored diarrhea. States her feet and abdomen are swollen. C/O abd/back pain. )      HISTORY OF PRESENT ILLNESS  Mariano Marcos is a 60 y.o. female who presents to the ED complaining of a 6 week history of upper abdominal pain with nausea and vomiting. Patient observed lying in bed, appears nontoxic and in no acute distress at this time. ,  Accompanied By mother today for evaluation. Patient reports that she drinks approximately 6 beers per day. States that for past 6 weeks she has had some upper abdominal discomfort with nausea and vomiting. Is however able to keep alcohol down. Last drink several hours ago. Denies headache, lightheadedness, dizziness or confusion. Upper abdominal pain worsening. She denies any bright red or coffee-ground emesis. Has had some diarrhea which has been black. No constipation. Denies fevers chills. No chest pain shortness of breath. No urinary symptoms. No vaginal complaints. Reports that she has no history of alcohol withdrawal.  He has not however attempted cessation in many years. No history of cirrhosis. Mother reports that she does appear yellow. No other complaints, modifying factors or associated symptoms. Nursing notes reviewed. Past Medical History:   Diagnosis Date    Abdominal pain     in ER Wed. 11/2  for Abdominal pain stool/showed blood.     Carpal tunnel syndrome     Hepatitis C     Hepatitis C     Hypertension     Pancreatitis     Scoliosis     SVT (supraventricular tachycardia) (HCC)      Past Surgical History:   Procedure Laterality Date    APPENDECTOMY      BACK SURGERY      r/t scoliosis    COLONOSCOPY      COLONOSCOPY  11/11    polyp    COLONOSCOPY  27 Jan 2016    polyps    ENDOSCOPIC ULTRASOUND (UPPER)  08/24/2016    Enlarged Pancreas    EYE SURGERY with folic acid 1 mg, adult multi-vitamin with vitamin k 10 mL, thiamine 100 mg   Intravenous Once Caitlin Rose, [address]        ondansetron [facility]) injection 4 mg  4 mg Intravenous Once Caitlin Rose, [address]        iopamidol (ISOVUE-370) 76 % injection 75 mL  75 mL Intravenous ONCE PRN CLAUDIO Desouza         Current Outpatient Medications   Medication Sig Dispense Refill    Multiple Vitamins-Minerals (THERAPEUTIC MULTIVITAMIN-MINERALS) tablet Take 1 tablet by mouth daily. Allergies   Allergen Reactions    Lorazepam     Morphine Itching       REVIEW OF SYSTEMS  10 systems reviewed, pertinent positives per HPI otherwise noted to be negative    PHYSICAL EXAM  /76   Pulse 121   Temp 97.9 °F (36.6 °C) (Oral)   Resp 18   Ht 5' 5\" (1.651 m)   Wt 110 lb (49.9 kg)   SpO2 98%   BMI 18.30 kg/m²   GENERAL APPEARANCE: Awake and alert. Cooperative. No acute distress. HEAD: Normocephalic. Atraumatic. EYES: PERRL. EOM's grossly intact. Scleral icterus. ENT: Mucous membranes are moist.   NECK: Supple. No JVD. No tracheal tenderness or deviation. No crepitus. HEART: RRR. No murmurs. No chest wall tenderness. LUNGS: Respirations unlabored. CTAB. Good air exchange. Speaking comfortably in full sentences. ABDOMEN: Soft. Non-distended. Epigastric and right upper quadrant tenderness without rigidity, guarding or rebound. Mildly positive Hodgson's. Negative McBurney's and Rovsing's. No fluid waves or ascites. No hernias or masses. Bowel sounds normal quadrants. No CVA tenderness. RECTAL: Good rectal tone noted without obvious external or internal hemorrhoids. No significant amount of stool noted in the rectal vault. No evidence of impaction. Stool noted to \"feeder was light brown in color. All this hematochezia or melena. Exam performed with nursing chaperone. EXTREMITIES: No peripheral edema. Moves all extremities equally. All extremities neurovascularly intact. Slightly jaundiced.   SKIN: Warm and dry. No acute rashes. NEUROLOGICAL: Alert and oriented. CN's 2-12 intact. No gross facial drooping. Strength 5/5, sensation intact. PSYCHIATRIC: Normal mood and affect. RADIOLOGY  Xr Chest Standard (2 Vw)    Result Date: 4/10/2019  EXAMINATION: TWO VIEWS OF THE CHEST 4/10/2019 8:17 pm COMPARISON: 11/21/2015 HISTORY: ORDERING SYSTEM PROVIDED HISTORY: sob TECHNOLOGIST PROVIDED HISTORY: Reason for exam:->sob Ordering Physician Provided Reason for Exam: sob Acuity: Acute Type of Exam: Initial FINDINGS: Posterior stabilization rods in place in the spine. The lungs are without acute focal process. Linear opacities seen at the lung bases bilaterally. There is no effusion or pneumothorax. The cardiomediastinal silhouette is stable. The osseous structures are stable. Linear opacification seen at the lung bases bilaterally could represent atelectasis or pneumonia Hiatal hernia again noted     ED COURSE  I have evaluated this patient. Attending physician was available for consultation. Patient received Zofran and Pepcid** for pain, with good relief. Triage vitals tachycardia pulse rate 121. Initiated on a banana bag. CMP consistent with decreased sodium and chloride levels of 119 and 81 respectively. Patient without confusion. Started on 1 L IV fluid bolus. Total bili 3.2. ALT AST 29 and 142 respectively. Mildly elevated alkaline phosphatase. Patient with normal lipase. BNP negative and troponin less than 0.01. Magnesium 1.3. Given 2 g of magnesium sulfate IV. Ethanol level detected at 56. Hemoccult-positive. A CT abdomen and pelvis consistent with cirrhosis. No acute findings otherwise. We're at this time recommended admission for dehydration and hyponatremia. Discussed case with hospitalist and orders have been placed. Chest x-ray stable. EKG was sinus tachycardia pulse rate 113 with PVCs noted. A discussion was had with Mrs. Daniela Sanchez regarding abdominal discomfort with nausea Ethanol Lvl 56 mg/dL   Magnesium   Result Value Ref Range    Magnesium 1.30 (L) 1.80 - 2.40 mg/dL   Blood Occult Stool Screen #1   Result Value Ref Range    Occult Blood Screening Result: POSITIVE  Normal range: Negative   (A)    EKG 12 Lead   Result Value Ref Range    Ventricular Rate 113 BPM    Atrial Rate 113 BPM    P-R Interval 138 ms    QRS Duration 70 ms    Q-T Interval 340 ms    QTc Calculation (Bazett) 466 ms    P Axis 60 degrees    R Axis 46 degrees    T Axis 31 degrees    Diagnosis       Sinus tachycardia with Premature supraventricular complexesOtherwise normal ECGWhen compared with ECG of 24-AUG-2016 11:38,Premature supraventricular complexes are now PresentVent. rate has increased BY  45 BPMNonspecific T wave abnormality now evident in Inferior leadsNonspecific T wave abnormality now evident in Anterior leads     I estimate there is LOW risk for ACUTE APPENDICITIS, BOWEL OBSTRUCTION, CHOLECYSTITIS, DIVERTICULITIS, INCARCERATED HERNIA, PANCREATITIS, or PERFORATED BOWEL or ULCER, thus I consider the discharge disposition reasonable. Also, there is no evidence or peritonitis, sepsis, or toxicity. Gokul Roca and I have discussed the diagnosis and risks, and we agree with discharging home to follow-up with their primary doctor. We also discussed returning to the Emergency Department immediately if new or worsening symptoms occur. We have discussed the symptoms which are most concerning (e.g., bloody stool, fever, changing or worsening pain, vomiting) that necessitate immediate return. FINAL Impression  1. Hyponatremia    2. Dehydration    3. Hypomagnesemia    4. Alcoholic cirrhosis of liver with ascites ([address]    5. History of alcohol abuse    6. Blood in stool      Blood pressure (!) 146/80, pulse 125, temperature 98 °F (36.7 °C), temperature source Oral, resp. rate 18, height 5' 5\" (1.651 m), weight 118 lb (53.5 kg), SpO2 97 %.     DISPOSITION  Patient was admitted to the hospital in stable condition.           Tata De [place]  04/11/19 2104

## 2019-04-11 NOTE — FLOWSHEET NOTE
04/11/19 1130   Encounter Summary   Services provided to: Patient and family together   Referral/Consult From: Nurse   Support System Parent; Family members   Place of Sikhism   ([facility])   Contact Episcopal No  ( aware.)   Continue Visiting   (4-11, AD info. prayer support.)   Complexity of Encounter Moderate   Length of Encounter 30 minutes   Spiritual Assessment Completed Yes   Spiritual/Moravian   Type Spiritual support   Assessment Calm; Approachable;Coping   Intervention Active listening;Nurtured hope;Prayer;Discussed relationship with God;Discussed belief system/[employer] practices/jeremy   Outcome Engaged in conversation;Expressed feelings/needs/concerns;Coping   Advance Directives (For Healthcare)   Healthcare Directive No, patient does not have an advance directive for healthcare treatment   Information on [address] given;Documents explained;Pt. not interested at this time   Provided forms and education on advance directives. Patient states she needs to give it a little more thought prior to completing. She has a strong Episcopal jeremy. Offered prn follow-up.

## 2019-04-11 NOTE — PROGRESS NOTES
Perfect Serve to Dr. Andrade Maxwell:    Adele Cinnamon called and advised patient's HR spike to 180s and then dropped back down. Patients vitals taken - stable. Will continue to monitor.     Electronically signed by Jean Guevara RN on 4/11/2019 at 7:41 AM

## 2019-04-11 NOTE — PROGRESS NOTES
[facility] called and advised patient's HR spike to 180s and then dropped back down. Patients vitals taken - stable. Will continue to monitor.

## 2019-04-12 ENCOUNTER — ANESTHESIA EVENT (OUTPATIENT)
Dept: ENDOSCOPY | Age: 59
DRG: 641 | End: 2019-04-12
Payer: MEDICARE

## 2019-04-12 ENCOUNTER — ANESTHESIA (OUTPATIENT)
Dept: ENDOSCOPY | Age: 59
DRG: 641 | End: 2019-04-12
Payer: MEDICARE

## 2019-04-12 ENCOUNTER — APPOINTMENT (OUTPATIENT)
Dept: ULTRASOUND IMAGING | Age: 59
DRG: 641 | End: 2019-04-12
Payer: MEDICARE

## 2019-04-12 VITALS — SYSTOLIC BLOOD PRESSURE: 160 MMHG | DIASTOLIC BLOOD PRESSURE: 139 MMHG | OXYGEN SATURATION: 90 %

## 2019-04-12 LAB
ALBUMIN FLUID: 0.3 G/DL
ALBUMIN SERPL-MCNC: 2.6 G/DL (ref 3.4–5)
ANION GAP SERPL CALCULATED.3IONS-SCNC: 10 MMOL/L (ref 3–16)
ANION GAP SERPL CALCULATED.3IONS-SCNC: 9 MMOL/L (ref 3–16)
APPEARANCE FLUID: CLEAR
BUN BLDV-MCNC: 5 MG/DL (ref 7–20)
BUN BLDV-MCNC: 5 MG/DL (ref 7–20)
CALCIUM SERPL-MCNC: 7.7 MG/DL (ref 8.3–10.6)
CALCIUM SERPL-MCNC: 7.8 MG/DL (ref 8.3–10.6)
CELL COUNT FLUID TYPE: NORMAL
CHLORIDE BLD-SCNC: 89 MMOL/L (ref 99–110)
CHLORIDE BLD-SCNC: 90 MMOL/L (ref 99–110)
CLOT EVALUATION: NORMAL
CO2: 25 MMOL/L (ref 21–32)
CO2: 25 MMOL/L (ref 21–32)
COLOR FLUID: NORMAL
CREAT SERPL-MCNC: <0.5 MG/DL (ref 0.6–1.1)
CREAT SERPL-MCNC: <0.5 MG/DL (ref 0.6–1.1)
FLUID PATH CONSULT: YES
FLUID TYPE: NORMAL
GFR AFRICAN AMERICAN: >60
GFR AFRICAN AMERICAN: >60
GFR NON-AFRICAN AMERICAN: >60
GFR NON-AFRICAN AMERICAN: >60
GLUCOSE BLD-MCNC: 119 MG/DL (ref 70–99)
GLUCOSE BLD-MCNC: 134 MG/DL (ref 70–99)
GLUCOSE BLD-MCNC: 135 MG/DL (ref 70–99)
HCT VFR BLD CALC: 25.7 % (ref 36–48)
HEMOGLOBIN: 8.8 G/DL (ref 12–16)
INR BLD: 1.6 (ref 0.86–1.14)
LYMPHOCYTES, BODY FLUID: 7 %
MACROPHAGE FLUID: 51 %
MAGNESIUM: 1.4 MG/DL (ref 1.8–2.4)
MCH RBC QN AUTO: 36.3 PG (ref 26–34)
MCHC RBC AUTO-ENTMCNC: 34.2 G/DL (ref 31–36)
MCV RBC AUTO: 106.1 FL (ref 80–100)
MESOTHELIAL FLUID: 34 %
MONONUCLEAR UNIDENTIFIED CELLS FLUID: 5 %
NEUTROPHIL, FLUID: 3 %
NUCLEATED CELLS FLUID: 93 /CUMM
NUMBER OF CELLS COUNTED FLUID: 100
OSMOLALITY URINE: 496 MOSM/KG (ref 390–1070)
PDW BLD-RTO: 15.8 % (ref 12.4–15.4)
PERFORMED ON: ABNORMAL
PHOSPHORUS: 2.4 MG/DL (ref 2.5–4.9)
PLATELET # BLD: 126 K/UL (ref 135–450)
PMV BLD AUTO: 7.5 FL (ref 5–10.5)
POTASSIUM REFLEX MAGNESIUM: 3.9 MMOL/L (ref 3.5–5.1)
POTASSIUM SERPL-SCNC: 3.8 MMOL/L (ref 3.5–5.1)
POTASSIUM SERPL-SCNC: 3.9 MMOL/L (ref 3.5–5.1)
PROTEIN FLUID: 1.1 G/DL
PROTHROMBIN TIME: 18.2 SEC (ref 9.8–13)
RBC # BLD: 2.42 M/UL (ref 4–5.2)
RBC FLUID: 400 /CUMM
SODIUM BLD-SCNC: 124 MMOL/L (ref 136–145)
SODIUM BLD-SCNC: 124 MMOL/L (ref 136–145)
WBC # BLD: 3.7 K/UL (ref 4–11)

## 2019-04-12 PROCEDURE — 6370000000 HC RX 637 (ALT 250 FOR IP): Performed by: INTERNAL MEDICINE

## 2019-04-12 PROCEDURE — 6360000002 HC RX W HCPCS: Performed by: NURSE ANESTHETIST, CERTIFIED REGISTERED

## 2019-04-12 PROCEDURE — 83735 ASSAY OF MAGNESIUM: CPT

## 2019-04-12 PROCEDURE — 80069 RENAL FUNCTION PANEL: CPT

## 2019-04-12 PROCEDURE — 0W9G3ZZ DRAINAGE OF PERITONEAL CAVITY, PERCUTANEOUS APPROACH: ICD-10-PCS | Performed by: RADIOLOGY

## 2019-04-12 PROCEDURE — 6360000002 HC RX W HCPCS: Performed by: PHYSICIAN ASSISTANT

## 2019-04-12 PROCEDURE — 2700000000 HC OXYGEN THERAPY PER DAY

## 2019-04-12 PROCEDURE — 6360000002 HC RX W HCPCS: Performed by: INTERNAL MEDICINE

## 2019-04-12 PROCEDURE — C9113 INJ PANTOPRAZOLE SODIUM, VIA: HCPCS | Performed by: PHYSICIAN ASSISTANT

## 2019-04-12 PROCEDURE — 7100000011 HC PHASE II RECOVERY - ADDTL 15 MIN: Performed by: INTERNAL MEDICINE

## 2019-04-12 PROCEDURE — 82042 OTHER SOURCE ALBUMIN QUAN EA: CPT

## 2019-04-12 PROCEDURE — 2580000003 HC RX 258: Performed by: INTERNAL MEDICINE

## 2019-04-12 PROCEDURE — 2580000003 HC RX 258: Performed by: PHYSICIAN ASSISTANT

## 2019-04-12 PROCEDURE — 84157 ASSAY OF PROTEIN OTHER: CPT

## 2019-04-12 PROCEDURE — 36415 COLL VENOUS BLD VENIPUNCTURE: CPT

## 2019-04-12 PROCEDURE — 2709999900 US GUIDED PARACENTESIS

## 2019-04-12 PROCEDURE — 3609017100 HC EGD: Performed by: INTERNAL MEDICINE

## 2019-04-12 PROCEDURE — 1200000000 HC SEMI PRIVATE

## 2019-04-12 PROCEDURE — 85027 COMPLETE CBC AUTOMATED: CPT

## 2019-04-12 PROCEDURE — 2709999900 HC NON-CHARGEABLE SUPPLY: Performed by: INTERNAL MEDICINE

## 2019-04-12 PROCEDURE — 85610 PROTHROMBIN TIME: CPT

## 2019-04-12 PROCEDURE — 7100000010 HC PHASE II RECOVERY - FIRST 15 MIN: Performed by: INTERNAL MEDICINE

## 2019-04-12 PROCEDURE — 0DJ08ZZ INSPECTION OF UPPER INTESTINAL TRACT, VIA NATURAL OR ARTIFICIAL OPENING ENDOSCOPIC: ICD-10-PCS | Performed by: INTERNAL MEDICINE

## 2019-04-12 PROCEDURE — 2500000003 HC RX 250 WO HCPCS: Performed by: NURSE ANESTHETIST, CERTIFIED REGISTERED

## 2019-04-12 PROCEDURE — 89051 BODY FLUID CELL COUNT: CPT

## 2019-04-12 PROCEDURE — 94761 N-INVAS EAR/PLS OXIMETRY MLT: CPT

## 2019-04-12 PROCEDURE — 3700000000 HC ANESTHESIA ATTENDED CARE: Performed by: INTERNAL MEDICINE

## 2019-04-12 RX ORDER — LABETALOL HYDROCHLORIDE 5 MG/ML
5 INJECTION, SOLUTION INTRAVENOUS EVERY 10 MIN PRN
Status: DISCONTINUED | OUTPATIENT
Start: 2019-04-12 | End: 2019-04-12 | Stop reason: HOSPADM

## 2019-04-12 RX ORDER — DIPHENHYDRAMINE HYDROCHLORIDE 50 MG/ML
12.5 INJECTION INTRAMUSCULAR; INTRAVENOUS
Status: DISCONTINUED | OUTPATIENT
Start: 2019-04-12 | End: 2019-04-12 | Stop reason: HOSPADM

## 2019-04-12 RX ORDER — PROMETHAZINE HYDROCHLORIDE 25 MG/ML
6.25 INJECTION, SOLUTION INTRAMUSCULAR; INTRAVENOUS
Status: DISCONTINUED | OUTPATIENT
Start: 2019-04-12 | End: 2019-04-12 | Stop reason: HOSPADM

## 2019-04-12 RX ORDER — ALBUTEROL SULFATE 90 UG/1
2 AEROSOL, METERED RESPIRATORY (INHALATION) EVERY 6 HOURS PRN
Status: DISCONTINUED | OUTPATIENT
Start: 2019-04-12 | End: 2019-04-13 | Stop reason: HOSPADM

## 2019-04-12 RX ORDER — PROPOFOL 10 MG/ML
INJECTION, EMULSION INTRAVENOUS PRN
Status: DISCONTINUED | OUTPATIENT
Start: 2019-04-12 | End: 2019-04-12 | Stop reason: SDUPTHER

## 2019-04-12 RX ORDER — MEPERIDINE HYDROCHLORIDE 50 MG/ML
12.5 INJECTION INTRAMUSCULAR; INTRAVENOUS; SUBCUTANEOUS EVERY 5 MIN PRN
Status: DISCONTINUED | OUTPATIENT
Start: 2019-04-12 | End: 2019-04-12 | Stop reason: HOSPADM

## 2019-04-12 RX ORDER — HYDRALAZINE HYDROCHLORIDE 20 MG/ML
5 INJECTION INTRAMUSCULAR; INTRAVENOUS EVERY 10 MIN PRN
Status: DISCONTINUED | OUTPATIENT
Start: 2019-04-12 | End: 2019-04-12 | Stop reason: HOSPADM

## 2019-04-12 RX ORDER — LIDOCAINE HYDROCHLORIDE 20 MG/ML
INJECTION, SOLUTION EPIDURAL; INFILTRATION; INTRACAUDAL; PERINEURAL PRN
Status: DISCONTINUED | OUTPATIENT
Start: 2019-04-12 | End: 2019-04-12 | Stop reason: SDUPTHER

## 2019-04-12 RX ORDER — ONDANSETRON 2 MG/ML
4 INJECTION INTRAMUSCULAR; INTRAVENOUS PRN
Status: DISCONTINUED | OUTPATIENT
Start: 2019-04-12 | End: 2019-04-12 | Stop reason: HOSPADM

## 2019-04-12 RX ADMIN — Medication 10 ML: at 21:00

## 2019-04-12 RX ADMIN — PROPOFOL 80 MG: 10 INJECTION, EMULSION INTRAVENOUS at 10:40

## 2019-04-12 RX ADMIN — MAGNESIUM GLUCONATE 500 MG ORAL TABLET 400 MG: 500 TABLET ORAL at 20:55

## 2019-04-12 RX ADMIN — SODIUM CHLORIDE 8 MG/HR: 9 INJECTION, SOLUTION INTRAVENOUS at 01:59

## 2019-04-12 RX ADMIN — MAGNESIUM GLUCONATE 500 MG ORAL TABLET 400 MG: 500 TABLET ORAL at 12:18

## 2019-04-12 RX ADMIN — OCTREOTIDE ACETATE 50 MCG/HR: 500 INJECTION, SOLUTION INTRAVENOUS; SUBCUTANEOUS at 01:59

## 2019-04-12 RX ADMIN — POTASSIUM CHLORIDE 20 MEQ: 20 TABLET, EXTENDED RELEASE ORAL at 12:18

## 2019-04-12 RX ADMIN — Medication 10 ML: at 12:19

## 2019-04-12 RX ADMIN — CEFTRIAXONE SODIUM 1 G: 1 INJECTION, POWDER, FOR SOLUTION INTRAMUSCULAR; INTRAVENOUS at 06:14

## 2019-04-12 RX ADMIN — HYDROCODONE BITARTRATE AND ACETAMINOPHEN 1 TABLET: 5; 325 TABLET ORAL at 18:32

## 2019-04-12 RX ADMIN — HYDROCODONE BITARTRATE AND ACETAMINOPHEN 1 TABLET: 5; 325 TABLET ORAL at 12:18

## 2019-04-12 RX ADMIN — HYDROCODONE BITARTRATE AND ACETAMINOPHEN 1 TABLET: 5; 325 TABLET ORAL at 02:17

## 2019-04-12 RX ADMIN — LIDOCAINE HYDROCHLORIDE 60 MG: 20 INJECTION, SOLUTION EPIDURAL; INFILTRATION; INTRACAUDAL; PERINEURAL at 10:40

## 2019-04-12 ASSESSMENT — PAIN SCALES - GENERAL
PAINLEVEL_OUTOF10: 0
PAINLEVEL_OUTOF10: 6
PAINLEVEL_OUTOF10: 7
PAINLEVEL_OUTOF10: 0
PAINLEVEL_OUTOF10: 10
PAINLEVEL_OUTOF10: 0
PAINLEVEL_OUTOF10: 0

## 2019-04-12 NOTE — PROGRESS NOTES
VSS - afebrile. Pt is alert and oriented x 4 with no history of falls. Assessment completed as charted. Bed is in lowest position with 2/4 bed rails raised. Bed alarm turned on, wheels locked, and call light within reach. Patient wearing non-skid socks and verbalizes understanding to call out for assistance. No further requests at this time. Will continue to monitor patient.     Vitals:    04/12/19 0445   BP: (!) 149/87   Pulse: 90   Resp: 18   Temp: 98 °F (36.7 °C)   SpO2: 92%     Electronically signed by Tiera Suarez RN on 4/12/2019 at 6:06 AM

## 2019-04-12 NOTE — PROGRESS NOTES
End of shift report given to Maria E Tam RN at bedside. Patient alert and oriented. Bed in lowest position with wheels locked. Call light within reach. No further needs at this time.

## 2019-04-12 NOTE — PROGRESS NOTES
Arrived to pre-op on cart and 02 at 2lpm nc. Hep lock left forearm flushed easy then lactated hung for diprivan EGD.

## 2019-04-12 NOTE — PLAN OF CARE
Problem: Pain:  Goal: Control of acute pain  Description  Control of acute pain  4/12/2019 1643 by Tiffany Lopez RN  Outcome: Ongoing  Note:   Pt remains pain level decrease from 0/10. Calling out less frequently for pain medications. Pt will call out to staff appropriately to report pain   4/12/2019 0605 by Floridalma Ramírez RN  Outcome: Ongoing  Note:   Patient's pain level controlled at this time. Will continue to monitor. Problem: Falls - Risk of:  Goal: Will remain free from falls  Description  Will remain free from falls  4/12/2019 1643 by Tiffany Lopez RN  Outcome: Ongoing  Note:   Pt remains independent and free from falls. 4/12/2019 0605 by Floridalma Ramírez RN  Outcome: Ongoing  Note:   Pt high fall risk. Instructed to use call light before getting out of bed. Call light within reach. Bed in low position. Bed alarm on. Patient free from falls and near-misses this shift. Will continue to monitor patient.

## 2019-04-12 NOTE — H&P
Pre-sedation Assessment    History and Physical / Pre-Sedation Assessment  Patient:  Cesilia Saldivar   :   1960     Intended Procedure: EGD      HPI: 59 yo w ETOH abuse admitted for 2 weeks of N/V. Emesis was heme-pos. Ronal Hammed. She reports black stools 3 days ago. No N/V or signs of GI bleed today. H/H , [address], CT w cirrhosis. She was treated for HepC w [facility] in  and has h/o chronic pancreatitis by EUS in .     Plan:   1. Needs INR in am  2. IV PPI  3. EGD       Past Medical History:   Diagnosis Date    Abdominal pain     in ER Wed.   for Abdominal pain stool/showed blood.  Carpal tunnel syndrome     Hepatitis C     Hepatitis C     Hypertension     Pancreatitis     Scoliosis     SVT ([address]raventricular tachycardia) ([facility] [address]          Procedure Laterality Date    APPENDECTOMY      BACK SURGERY      r/t scoliosis    COLONOSCOPY      COLONOSCOPY      polyp    COLONOSCOPY  2016    polyps    ENDOSCOPIC ULTRASOUND (UPPER)  2016    Enlarged Pancreas    EYE SURGERY Right 2/5/15    Cataract removal    TUBAL LIGATION      UPPER GASTROINTESTINAL ENDOSCOPY  11    bx distal esophagus       Nurses notes reviewed and agreed.   Medications reviewed  Current Facility-Administered Medications   Medication Dose Route Frequency Provider Last Rate Last Dose    magnesium oxide (MAG-OX) tablet 400 mg  400 mg Oral BID Fred Deluca MD        diphenhydrAMINE (BENADRYL) injection 12.5 mg  12.5 mg Intravenous Once PRN C Vandana Corbin MD        [facility]) injection 4 mg  4 mg Intravenous PRN CHANTAL Corbin MD        promethazine (PHENERGAN) injection 6.25 mg  6.25 mg Intravenous Q15 Min PRN CHANTAL Corbin MD        labetalol (NORMODYNE;TRANDATE) injection 5 mg  5 mg Intravenous Q10 Min PRN CHANTAL Corbin MD        hydrALAZINE (APRESOLINE) injection 5 mg  5 mg Intravenous Q10 Min PRN CHANTAL Corbin MD        meperidine (DEMEROL) injection 12.5 mg  12.5 mg Intravenous Q5 Min PRN CHANTAL Funes MD        sodium chloride flush 0.9 % injection 10 mL  10 mL Intravenous 2 times per day Trista Shoemaker MD   10 mL at 04/11/19 0849    sodium chloride flush 0.9 % injection 10 mL  10 mL Intravenous PRN Trista Shoemaker MD        magnesium hydroxide (MILK OF MAGNESIA) 400 MG/5ML suspension 30 mL  30 mL Oral Daily PRN Trista Shoemaker MD        ondansetron (ZOFRAN) injection 4 mg  4 mg Intravenous Q6H PRN Trista Shoemaker MD   4 mg at 04/11/19 0433    HYDROcodone-acetaminophen (NORCO) 5-325 MG per tablet 1 tablet  1 tablet Oral Q4H PRN Trista Shoemaker MD   1 tablet at 04/12/19 0217    octreotide (SANDOSTATIN) 500 mcg in sodium chloride 0.9 % 100 mL infusion  50 mcg/hr Intravenous Continuous Colette Petit MD 10 mL/hr at 04/12/19 0159 50 mcg/hr at 04/12/19 0159    sodium chloride (PF) 0.9 % injection 10 mL  10 mL Intravenous PRN Colette Petit MD        cefTRIAXone (ROCEPHIN) 1 g IVPB in 50 mL D5W minibag  1 g Intravenous Q24H Colette Petit MD   Stopped at 04/12/19 4455    potassium chloride (KLOR-CON M) extended release tablet 20 mEq  20 mEq Oral BID Lynette Kinsey MD   20 mEq at 04/11/19 2019    pantoprazole (PROTONIX) 80 mg in sodium chloride 0.9 % 100 mL infusion  8 mg/hr Intravenous Continuous Arthuro Groom, PA 10 mL/hr at 04/12/19 0159 8 mg/hr at 04/12/19 0159       Allergies: Allergies   Allergen Reactions    Lorazepam     Morphine Itching           Physical Exam:  Vital Signs: /74   Pulse 83   Temp 97.9 °F (36.6 °C) (Oral)   Resp 18   Ht 5' 5\" (1.651 m)   Wt 118 lb (53.5 kg)   SpO2 98%   BMI 19.64 kg/m²  Body mass index is 19.64 kg/m².   Airway:Normal  Cardiac:Normal  Pulmonary:Normal  Abdomen:Normal  Specific to procedure: none      Pre-Procedure Assessment/Plan:  ASA 3 - Patient with moderate systemic disease with functional limitations  Malampati class II  Level of Sedation Plan:Deep sedation    Post Procedure plan: Return to same level of care    I assessed the patient and find that the patient is in satisfactory condition to proceed with the planned procedure and sedation plan. I have explained the risk, benefits, and alternatives to the procedure. The patient understands and agrees to proceed.   Yes    Rose Mary Cuevas MD       (O) [phone]        Rose Mary Cuevas  10:39 AM 4/12/2019

## 2019-04-12 NOTE — PROGRESS NOTES
Perfect Serve to Dr. Lizeth Becker:    [place] - Pt briefly sustained in sinus tach up to . Reports being in deep sleep at the time. Vitals taken -- stable. Will continue to monitor.     Electronically signed by Jean Guevara RN on 4/12/2019 at 4:52 AM

## 2019-04-12 NOTE — PROGRESS NOTES
1400:    Pt to paracentesis. Off unit, [address] notified.  Electronically signed by Cindi Colin RN on 4/12/2019 at 4:37 PM

## 2019-04-12 NOTE — ANESTHESIA POSTPROCEDURE EVALUATION
Department of Anesthesiology  Postprocedure Note    Patient: Dalila Olszewski  MRN: [id number]  YOB: 1960  Date of evaluation: 4/12/2019  Time:  11:40 AM     Procedure Summary     Date:  04/12/19 Room / Location:  [address] ENDOSCOPY    Anesthesia Start:  7382 Anesthesia Stop:  9247    Procedure:  EGD WITH ANESTHESIA (N/A ) Diagnosis:  (NAUSEA AND VOMITING, ANEMIA)    Surgeon:  Emmanuel Edwards MD Responsible Provider:  Raya Boyer MD    Anesthesia Type:  general ASA Status:  2          Anesthesia Type: general    Michael Phase I: Michael Score: 10    Michael Phase II: Michael Score: 9    Last vitals: Reviewed and per EMR flowsheets. Anesthesia Post Evaluation    Patient location during evaluation: PACU  Patient participation: complete - patient participated  Level of consciousness: awake  Airway patency: patent  Nausea & Vomiting: no nausea and no vomiting  Complications: no  Cardiovascular status: blood pressure returned to baseline  Respiratory status: acceptable  Hydration status: euvolemic  Comments: Vital signs stable upon transfer to Stage 2 recovery.

## 2019-04-12 NOTE — PROGRESS NOTES
Pt off unit to endo, will give medications once pt arrives back to room.  Electronically signed by Yajaira Johnson RN on 4/12/2019 at 9:47 AM

## 2019-04-12 NOTE — OP NOTE
Esophagogastroduodenoscopy Note    Patient:   Amrit Bills    YOB: 1960    Facility:   [facility] [Inpatient]   Referring/PCP: Kellen Zhao MD    Procedure:   Esophagogastroduodenoscopy --diagnostic  Date:     4/12/2019   Endoscopist:  Jennifer Metzger     Preoperative Diagnosis: Anemia,  N/V and cirrhosis    Postoperative Diagnosis:  PHG and trace esoph varices    Anesthesia:  Propofol  Estimated blood loss: None    Complications: None    Description of Procedure:  Informed consent was obtained from the patient after explanation of the procedure including indications, description of the procedure,  benefits and possible risks and complications of the procedure, and alternatives. Questions were answered. The patient's history was reviewed and a directed physical examination was performed prior to the procedure. Patient was monitored throughout the procedure with pulse oximetry and periodic assessment of vital signs. Patient was sedated as noted above. The Nursing staff and I performed a time out. With the patient in the left lateral decubitus position, the Olympus videoendoscope was placed in the patient's mouth and under direct visualization passed into the esophagus. The scope was ultimately passed to the duodenal bulb only due to transient desaturation. Visualization was performed during both introduction and withdrawal of the endoscope and retroflexed view of the proximal stomach was obtained. Findings[de-identified]   Esophagus: Trace esoph varices The findings do not support a diagnosis of Moore's Esophagus. Stomach: PHG   Duodenum: normal    Recommendations: -Acid suppression with a proton pump inhibitor. , -Agree w Paracenthesis and will F/U as outpatient    Jennifer Metzger MD       (O) [phone]        Jennifer Metzger MD

## 2019-04-12 NOTE — ANESTHESIA PRE PROCEDURE
release tablet 20 mEq  20 mEq Oral BID Gregorio Pang MD   20 mEq at 04/11/19 2019    pantoprazole (PROTONIX) 80 mg in sodium chloride 0.9 % 100 mL infusion  8 mg/hr Intravenous Continuous Donta Celaya 10 mL/hr at 04/12/19 0159 8 mg/hr at 04/12/19 0159       Allergies: Allergies   Allergen Reactions    Lorazepam     Morphine Itching       Problem List:    Patient Active Problem List   Diagnosis Code    Acute pancreatitis K85.90    N&V (nausea and vomiting) R11.2    Abdominal pain R10.9    Transaminitis R74.0    Rash, drug L27.0    Hyponatremia E87.1    Chronic hepatitis C virus infection ([facility] [address] B18.2    Hypertension I10    Tobacco dependence syndrome F17.200       Past Medical History:        Diagnosis Date    Abdominal pain     in ER Wed. 11/2  for Abdominal pain stool/showed blood.     Carpal tunnel syndrome     Hepatitis C     Hepatitis C     Hypertension     Pancreatitis     Scoliosis     SVT (supraventricular tachycardia) (HCC)        Past Surgical History:        Procedure Laterality Date    APPENDECTOMY      BACK SURGERY      r/t scoliosis    COLONOSCOPY      COLONOSCOPY  11/11    polyp    COLONOSCOPY  27 Jan 2016    polyps    ENDOSCOPIC ULTRASOUND (UPPER)  08/24/2016    Enlarged Pancreas    EYE SURGERY Right 2/5/15    Cataract removal    TUBAL LIGATION      UPPER GASTROINTESTINAL ENDOSCOPY  11/4/11    bx distal esophagus       Social History:    Social History     Tobacco Use    Smoking status: Current Every Day Smoker     Packs/day: 0.25     Years: 1.00     Pack years: 0.25     Types: Cigarettes    Smokeless tobacco: Never Used    Tobacco comment: only smokes 4 a day   Substance Use Topics    Alcohol use: Yes     Comment: 3 drinks per day                                Ready to quit: Not Answered  Counseling given: Not Answered  Comment: only smokes 4 a day      Vital Signs (Current):   Vitals:    04/11/19 2310 04/12/19 0324 04/12/19 0445 04/12/19 0735   BP: 107/67 and Nursing notes reviewed  Airway: Mallampati: II  TM distance: >3 FB   Neck ROM: full  Mouth opening: > = 3 FB Dental: normal exam         Pulmonary:normal exam                               Cardiovascular:    (+) hypertension: no interval change,          Beta Blocker:  No for medical reason         Neuro/Psych:               GI/Hepatic/Renal:   (+) hepatitis:, liver disease:,           Endo/Other:                     Abdominal:           Vascular:                                      Anesthesia Plan      general     ASA 2       Induction: intravenous. Anesthetic plan and risks discussed with patient. Plan discussed with CRNA.                   Mechelle Gilbert MD   4/12/2019

## 2019-04-12 NOTE — PLAN OF CARE
Problem: Falls - Risk of:  Goal: Will remain free from falls  Description  Will remain free from falls  4/12/2019 0605 by Gaurang Jack RN  Outcome: Ongoing  Note:   Pt high fall risk. Instructed to use call light before getting out of bed. Call light within reach. Bed in low position. Bed alarm on. Patient free from falls and near-misses this shift. Will continue to monitor patient. Problem: Pain:  Goal: Control of acute pain  Description  Control of acute pain  Outcome: Ongoing  Note:   Patient's pain level controlled at this time. Will continue to monitor. Problem: Risk for Impaired Skin Integrity  Goal: Tissue integrity - skin and mucous membranes  Description  Structural intactness and normal physiological function of skin and  mucous membranes. Outcome: Ongoing  Note:   Patient's skin assessed. See flowsheet. Patient turned in bed. Pressure ulcer prevention in place. No issues with skin integrity this shift. Will continue to monitor.

## 2019-04-12 NOTE — PROGRESS NOTES
Hospitalist Progress Note      PCP: Rachael Friedman MD    Date of Admission: 4/10/2019    Chief Complaint: Upper Abdominal pain with N/V     Hospital Course: Reviewed H&P     Subjective: Patient reports slightly feeling better . Seen with mom at bedside . Noted NA improved to 124 (Baseline) ; noted abdomen slightly distended more . Will arrange for US guided paracentesis . - Reviewed And D/W GI (Dr. Song Dozier ) regarding POC - EGD later this AM     Medications:  Reviewed    Infusion Medications    octreotide (SANDOSTATIN) infusion 50 mcg/hr (04/12/19 0159)    pantoprozole (PROTONIX) infusion 8 mg/hr (04/12/19 0159)     Scheduled Medications    magnesium oxide  400 mg Oral BID    sodium chloride flush  10 mL Intravenous 2 times per day    cefTRIAXone (ROCEPHIN) IV  1 g Intravenous Q24H     PRN Meds: sodium chloride flush, magnesium hydroxide, ondansetron, HYDROcodone 5 mg - acetaminophen, sodium chloride (PF)      Intake/Output Summary (Last 24 hours) at 4/12/2019 1616  Last data filed at 4/12/2019 1108  Gross per 24 hour   Intake 1342 ml   Output 400 ml   Net 942 ml       Physical Exam Performed:  /86   Pulse 99   Temp 97.5 °F (36.4 °C) (Oral)   Resp 18   Ht 5' 5\" (1.651 m)   Wt 118 lb (53.5 kg)   SpO2 94%   BMI 19.64 kg/m²     General appearance:  No apparent distress, appears stated age and cooperative. HEENT:  Normal cephalic, atraumatic without obvious deformity. Pupils equal, round, and reactive to light. Extra ocular muscles intact. Conjunctivae/corneas clear. Neck: Supple, with full range of motion. No jugular venous distention. Trachea midline. Respiratory:  Normal respiratory effort. Clear to auscultation, bilaterally without Rales/Wheezes/Rhonchi. Cardiovascular:  Regular rate and rhythm with normal S1/S2 without murmurs, rubs or gallops. Abdomen: Soft, mildly tender, mildly -distended with normal bowel sounds. Musculoskeletal:  No clubbing, cyanosis or edema bilaterally. Full range of motion without deformity. Skin: Skin color, texture, turgor normal.  No rashes or lesions. Neurologic:  Neurovascularly intact without any focal sensory/motor deficits. Cranial nerves: II-XII intact, grossly non-focal.  Psychiatric:  Alert and oriented, thought content appropriate, normal insight  Capillary Refill: Brisk,< 3 seconds   Peripheral Pulses: +2 palpable, equal bilaterally     Labs:   Recent Labs     04/10/19  2017 04/11/19  0759 04/12/19  0741   WBC 6.2 4.6 3.7*   HGB 10.5* 8.8* 8.8*   HCT 30.4* 24.9* 25.7*    127* 126*     Recent Labs     04/11/19  1637 04/11/19  2343 04/12/19  0741   * 124* 124*   K 3.5 3.8 3.9  3.9   CL 85* 89* 90*   CO2 25 25 25   BUN 6* 5* 5*   CREATININE <0.5* <0.5* <0.5*   CALCIUM 7.6* 7.7* 7.8*   PHOS  --   --  2.4*     Recent Labs     04/10/19  2017   *   ALT 29   BILITOT 3.2*   ALKPHOS 155*     Recent Labs     04/12/19  0741   INR 1.60*     Recent Labs     04/10/19  2017   TROPONINI <0.01       Urinalysis:    Lab Results   Component Value Date    NITRU Negative 04/11/2019    WBCUA 0-2 04/11/2019    RBCUA 3-5 04/11/2019    BLOODU SMALL 04/11/2019    SPECGRAV 1.010 04/11/2019    GLUCOSEU Negative 04/11/2019       Radiology:  US GUIDED PARACENTESIS   Final Result   Successful ultrasound guided paracentesis. CT ABDOMEN PELVIS W IV CONTRAST Additional Contrast? None   Final Result   1. Liver findings suggest cirrhosis. 2. Moderate ascites without splenomegaly. 3. Colonic wall thickening is nonspecific in the setting of cirrhosis and   ascites though colitis must be considered. XR CHEST STANDARD (2 VW)   Final Result   Linear opacification seen at the lung bases bilaterally could represent   atelectasis or pneumonia      Hiatal hernia again noted           Active Hospital Problems    Diagnosis Date Noted    Hyponatremia [E87.1] 04/05/2014     Assessment/Plan:  1.  Acute on Chronic Hyponatremia likely due to [employer] in the setting of Alcoholic Liver disease   - Na 119 on admission ; Currently upto 124 (her baseline )  this AM after IV fluids ; Nephrology evaluated and recommended D/c IV fludis and monitor for now . Consider tolvaptan PRN .      2. Anemia in the setting of Alcoholic Liver disease and Upper GI bleed   - FOBT positive ; Started on Octreotide and Protonix gtt in ED - Continue   - Monitor H/H and plan for PRBC transfusion for Hb < 7   - GI consulted from ED -Evaluated with plans to do EGD today - follow results      3. Abdominal Pain with Moderates Ascites on CT - Normal WBC and no signs of infection - S/p  US guided Paracentesis this AM and had 400 cc drained . Ascitic fluid analysis pending .    - On Empiric Rocephin      4. Chronic Low back pain with Hx of Hardware in her back - On Norco Prn - Continue     DVT Prophylaxis: SCD given GI bleed   Diet: DIET GENERAL;  Code Status: Full Code    PT/OT Eval Status: Ambulatory     Dispo - Likely 1-2 days pending Clinical improvement and Ok with sub specialists (GI and Nephro)        The note was completed using Dragon -speech recognition software & EMR  . Every effort was made to ensure accuracy; however, inadvertent computerized transcription errors may be present.     Dakotah Quintanilla MD

## 2019-04-13 VITALS
SYSTOLIC BLOOD PRESSURE: 129 MMHG | RESPIRATION RATE: 14 BRPM | BODY MASS INDEX: 19.66 KG/M2 | HEIGHT: 65 IN | WEIGHT: 118 LBS | DIASTOLIC BLOOD PRESSURE: 80 MMHG | TEMPERATURE: 97.6 F | OXYGEN SATURATION: 98 % | HEART RATE: 89 BPM

## 2019-04-13 LAB
ANION GAP SERPL CALCULATED.3IONS-SCNC: 11 MMOL/L (ref 3–16)
BUN BLDV-MCNC: 3 MG/DL (ref 7–20)
CALCIUM SERPL-MCNC: 8 MG/DL (ref 8.3–10.6)
CHLORIDE BLD-SCNC: 86 MMOL/L (ref 99–110)
CO2: 27 MMOL/L (ref 21–32)
CREAT SERPL-MCNC: <0.5 MG/DL (ref 0.6–1.1)
GFR AFRICAN AMERICAN: >60
GFR NON-AFRICAN AMERICAN: >60
GLUCOSE BLD-MCNC: 110 MG/DL (ref 70–99)
HCT VFR BLD CALC: 29.4 % (ref 36–48)
HEMOGLOBIN: 10.1 G/DL (ref 12–16)
MCH RBC QN AUTO: 36.4 PG (ref 26–34)
MCHC RBC AUTO-ENTMCNC: 34.2 G/DL (ref 31–36)
MCV RBC AUTO: 106.4 FL (ref 80–100)
PDW BLD-RTO: 15.9 % (ref 12.4–15.4)
PLATELET # BLD: 157 K/UL (ref 135–450)
PMV BLD AUTO: 7.5 FL (ref 5–10.5)
POTASSIUM REFLEX MAGNESIUM: 3.6 MMOL/L (ref 3.5–5.1)
POTASSIUM SERPL-SCNC: 3.6 MMOL/L (ref 3.5–5.1)
RBC # BLD: 2.76 M/UL (ref 4–5.2)
SODIUM BLD-SCNC: 124 MMOL/L (ref 136–145)
WBC # BLD: 4.6 K/UL (ref 4–11)

## 2019-04-13 PROCEDURE — 6370000000 HC RX 637 (ALT 250 FOR IP): Performed by: INTERNAL MEDICINE

## 2019-04-13 PROCEDURE — 2580000003 HC RX 258: Performed by: INTERNAL MEDICINE

## 2019-04-13 PROCEDURE — 94761 N-INVAS EAR/PLS OXIMETRY MLT: CPT

## 2019-04-13 PROCEDURE — 36415 COLL VENOUS BLD VENIPUNCTURE: CPT

## 2019-04-13 PROCEDURE — 2700000000 HC OXYGEN THERAPY PER DAY

## 2019-04-13 PROCEDURE — 6360000002 HC RX W HCPCS: Performed by: INTERNAL MEDICINE

## 2019-04-13 PROCEDURE — 80048 BASIC METABOLIC PNL TOTAL CA: CPT

## 2019-04-13 PROCEDURE — 85027 COMPLETE CBC AUTOMATED: CPT

## 2019-04-13 PROCEDURE — 2580000003 HC RX 258

## 2019-04-13 RX ORDER — PANTOPRAZOLE SODIUM 40 MG/1
40 TABLET, DELAYED RELEASE ORAL
Qty: 30 TABLET | Refills: 3 | Status: SHIPPED | OUTPATIENT
Start: 2019-04-13

## 2019-04-13 RX ORDER — TOLVAPTAN 30 MG/1
30 TABLET ORAL ONCE
Status: COMPLETED | OUTPATIENT
Start: 2019-04-13 | End: 2019-04-13

## 2019-04-13 RX ORDER — SODIUM CHLORIDE 9 MG/ML
INJECTION, SOLUTION INTRAVENOUS
Status: COMPLETED
Start: 2019-04-13 | End: 2019-04-13

## 2019-04-13 RX ORDER — PANTOPRAZOLE SODIUM 40 MG/1
40 TABLET, DELAYED RELEASE ORAL
Status: DISCONTINUED | OUTPATIENT
Start: 2019-04-13 | End: 2019-04-13 | Stop reason: HOSPADM

## 2019-04-13 RX ADMIN — HYDROCODONE BITARTRATE AND ACETAMINOPHEN 1 TABLET: 5; 325 TABLET ORAL at 09:31

## 2019-04-13 RX ADMIN — HYDROCODONE BITARTRATE AND ACETAMINOPHEN 1 TABLET: 5; 325 TABLET ORAL at 03:03

## 2019-04-13 RX ADMIN — PANTOPRAZOLE SODIUM 40 MG: 40 TABLET, DELAYED RELEASE ORAL at 09:31

## 2019-04-13 RX ADMIN — CEFTRIAXONE SODIUM 1 G: 1 INJECTION, POWDER, FOR SOLUTION INTRAMUSCULAR; INTRAVENOUS at 05:46

## 2019-04-13 RX ADMIN — MAGNESIUM GLUCONATE 500 MG ORAL TABLET 400 MG: 500 TABLET ORAL at 09:31

## 2019-04-13 RX ADMIN — TOLVAPTAN 30 MG: 30 TABLET ORAL at 13:19

## 2019-04-13 RX ADMIN — Medication 10 ML: at 09:31

## 2019-04-13 RX ADMIN — SODIUM CHLORIDE 250 ML: 9 INJECTION, SOLUTION INTRAVENOUS at 05:46

## 2019-04-13 ASSESSMENT — PAIN DESCRIPTION - PAIN TYPE: TYPE: ACUTE PAIN

## 2019-04-13 ASSESSMENT — PAIN DESCRIPTION - FREQUENCY: FREQUENCY: CONTINUOUS

## 2019-04-13 ASSESSMENT — PAIN SCALES - GENERAL
PAINLEVEL_OUTOF10: 7
PAINLEVEL_OUTOF10: 0
PAINLEVEL_OUTOF10: 0
PAINLEVEL_OUTOF10: 6
PAINLEVEL_OUTOF10: 6

## 2019-04-13 ASSESSMENT — PAIN DESCRIPTION - DESCRIPTORS: DESCRIPTORS: CONSTANT

## 2019-04-13 ASSESSMENT — PAIN DESCRIPTION - ONSET: ONSET: ON-GOING

## 2019-04-13 ASSESSMENT — PAIN DESCRIPTION - LOCATION: LOCATION: ABDOMEN;BACK

## 2019-04-13 NOTE — PROGRESS NOTES
Oxygen documentation:    1. O2 saturation at REST on ROOM AIR = 89%    If saturation is 89% or above please proceed with steps 2 and 3. 2. O2 saturation with AMBULATION of 20 feet on ROOM AIR = 86%  3.  AMBULATION of 20 feet on 2 liter/min = 92%        Electronically signed by Alberto Jorge RN on 4/13/2019 at 10:03 AM

## 2019-04-13 NOTE — PLAN OF CARE
Problem: Pain:  Goal: Pain level will decrease  Description  Pain level will decrease  Outcome: Met This Shift  Note:   Pt denies severe pain.  Ambulating halls and less dependent on medications

## 2019-04-13 NOTE — PROGRESS NOTES
Bowel sounds are normal.   There is no abdominal tenderness. Assessment & Plan  61 yo w ETOH abuse admitted for 2 weeks of N/V and hyponatremia. Emesis was heme-pos. brown. She reports black stools 6 days ago. No N/V or signs of GI bleed. H/H 8/25, Plt 127, CT w cirrhosis. EGD w PHG and trace esoph varices. Nephrology are holding diuretics for now and will follow as outpatient. Has mild ascites and had a 500 cc paracenthesis. She was treated for HepC w [facility] in 2015 and has h/o chronic pancreatitis by EUS in 2016.     Plan:     1. OK to D/C home and F/U as outpatient  2.  Abstain from ETOH  3.      Rose Mary Cuevas MD       ([phone]) [phone]        Rose Mary Cuevas MD  4/13/2019

## 2019-04-13 NOTE — PROGRESS NOTES
RESPIRATORY THERAPY ASSESSMENT    Name:  Patti Harding  Medical Record Number:  [id number]  Age: 60 y.o. Gender: female  : 1960  Today's Date:  2019  Room:  [facility]0212-01    Assessment     Is the patient being admitted for a COPD or Asthma exacerbation? No   (If yes the patient will be seen every 4 hours for the first 24 hours and then reassessed)    Patient Admission Diagnosis      Allergies  Allergies   Allergen Reactions    Lorazepam     Morphine Itching       Minimum Predicted Vital Capacity:     na          Actual Vital Capacity:      na              Pulmonary History:No history  Home Oxygen Therapy:  room air  Home Respiratory Therapy:Albuterol q6prn  Current Respiratory Therapy:  Albuterol q6prn          Respiratory Severity Index(RSI)   Patients with orders for inhalation medications, oxygen, or any therapeutic treatment modality will be placed on Respiratory Protocol. They will be assessed with the first treatment and at least every 72 hours thereafter. The following severity scale will be used to determine frequency of treatment intervention.     Smoking History: Pulmonary Disease or Smoking History, Greater than 15 pack year = 2    Social History  Social History     Tobacco Use    Smoking status: Current Every Day Smoker     Packs/day: 0.25     Years: 1.00     Pack years: 0.25     Types: Cigarettes    Smokeless tobacco: Never Used    Tobacco comment: only smokes 4 a day   Substance Use Topics    Alcohol use: Yes     Comment: 3 drinks per day    Drug use: No     Frequency: 3.0 times per week       Recent Surgical History: None = 0  Past Surgical History  Past Surgical History:   Procedure Laterality Date    APPENDECTOMY      BACK SURGERY      r/t scoliosis    COLONOSCOPY      COLONOSCOPY      polyp    COLONOSCOPY  2016    polyps    ENDOSCOPIC ULTRASOUND (UPPER)  2016    Enlarged Pancreas    EYE SURGERY Right 2/5/15    Cataract removal    TUBAL LIGATION      < 140 bpm  c. RR < 30 bpm                d. Can demonstrate a 2-3 second inspiratory hold  4. Bronchodilators will be administered via Hand Held Nebulizer [facility]) to patients when ANY of the following criteria are met  a. Incognizant or uncooperative          b. Patients treated with HHN at Home        c. Unable to demonstrate proper use of MDI with spacer     d. RR > 30 bpm   5. Bronchodilators will be delivered via Metered Dose Inhaler (MDI), HHN, Aerogen to intubated patients on mechanical ventilation. 6. Inhalation medication orders will be delivered and/or substituted as outlined below. Aerosolized Medications Ordering and Administration Guidelines:    1. All Medications will be ordered by a physician, and their frequency and/or modality will be adjusted as defined by the patients Respiratory Severity Index (RSI) score. 2. If the patient does not have documented COPD, consider discontinuing anticholinergics when RSI is less than 9.  3. If the bronchospasm worsens (increased RSI), then the bronchodilator frequency can be increased to a maximum of every 4 hours. If greater than every 4 hours is required, the physician will be contacted. 4. If the bronchospasm improves, the frequency of the bronchodilator can be decreased, based on the patient's RSI, but not less than home treatment regimen frequency. 5. Bronchodilator(s) will be discontinued if patient has a RSI less than 9 and has received no scheduled or as needed treatment for 72  Hrs. Patients Ordered on a Mucolytic Agent:    1. Must always be administered with a bronchodilator. 2. Discontinue if patient experiences worsened bronchospasm, or secretions have lessened to the point that the patient is able to clear them with a cough. Anti-inflammatory and Combination Medications:    1.  If the patient lacks prior history of lung disease, is not using inhaled anti-inflammatory medication at home, and lacks wheezing by examination or by history for at least 24 hours, contact physician for possible discontinuation.

## 2019-04-13 NOTE — PROGRESS NOTES
Spoke with MD. Pt does not qualify for home O2 per walk test repeat. Pt able to discharge and follow up with nephro and GI as outpatient.  Electronically signed by Selena Pierre RN on 4/13/2019 at 3:30 PM

## 2019-04-13 NOTE — PLAN OF CARE
Pt remained free of falls. Call light within reach. A/ox4, calls out appropriately. Ambulates independently without assistive devices. Family at bedside. Non skid footwear in place. Bed locked and in lowest position. Will continue to monitor.

## 2019-04-13 NOTE — DISCHARGE SUMMARY
Hospital Medicine Discharge Summary    Patient ID: Rigo Skinner      Patient's PCP: Yojana Sharma MD    Admit Date: 4/10/2019     Discharge Date:  04/13/19    Admitting Physician: Bird Espinoza MD     Discharge Physician: Drea Valerio MD     Discharge Diagnoses: Active Hospital Problems    Diagnosis Date Noted    Hyponatremia [E87.1] 04/05/2014       The patient was seen and examined on day of discharge and this discharge summary is in conjunction with any daily progress note from day of discharge. History Of Present Illness: 60 y.o. female who hasn't been following with her PCP for the last 1.5 year  presented to [facility] ED  with her mom with C/o 6 week  history of upper abdominal pain with nausea and vomiting.   Patient reports that she drinks approximately 6 beers per day.  States that for past 6 weeks she has had some upper abdominal discomfort with nausea and vomiting.  For the last few days her Symptoms have worsened and so presented to ED for further E/M .  Denies headache, lightheadedness, dizziness or confusion.  Upper abdominal pain worsening.  She has N/V x 1 but intermittently had diarrhea . Also had bright some  coffee-ground emesis.  Has had some diarrhea which has been black.  No constipation.  Denies fevers chills.  No chest pain shortness of breath.  No urinary symptoms.  No vaginal complaints.  Reports that she has no history of alcohol withdrawal.  She has not however attempted cessation in many years.  No history of cirrhosis.  Mother reports that she does appear yellow.     ED Course : Upon arrival to ED , she was found to be tachycardic and normotensive  . Labs showed severe hyponatremia with Na -119 and hypomagnesemia . Her Hb 10.5 with macrocytosis and positive Occult blood . CT A/P s/o Hepatic Cirrhosis with Moderate ascites w/o splenomegaly . She received 1 L fluid bolus in ED and was admitted to the hospital for further E/M.  Nephrology and GI were consulted .      Upon evaluation by me this AM, Patient was already evaluated by Nephrology and fluids were D/alberto and recommended to consider Tolvaptan PRN . Gi evaluated with plans for possible EGD once hyponatremia improves . She was also  On Octreotide and Protonix gtt     Hospital Course: By Problem List   1. Acute on Chronic Hyponatremia likely due to [facility] potomania in the setting of Alcoholic Liver disease   - Na 119 on admission ; Currently upto 124 (her baseline )  this AM after IV fluids ; Nephrology followed and assisted with management.        2. Anemia in the setting of Alcoholic Liver disease and Upper GI bleed   - FOBT positive ; Started on Octreotide and Protonix gtt in ED - Continue   - Monitor H/H and plan for PRBC transfusion for Hb < 7   - GI consulted from ED - Evaluated with   EGD on 4/12/19 showed PHG and trace esoph varices - advised acid suppression with PPI     3. Abdominal Pain with Moderates Ascites on CT - Normal WBC and no signs of infection - S/p  US guided Paracentesis this AM and had 400 cc drained . Ascitic fluid analysis Ruled Out SBP .    - Received  Empiric Rocephin and discontinued later   - GI evaluated with recommendations to follow as outpatient     4. Chronic Low back pain with Hx of Hardware in her back - On Norco Prn - Continue      And discharged home in stable medical condition          Physical Exam Performed:   /80   Pulse 89   Temp 97.6 °F (36.4 °C) (Axillary)   Resp 14   Ht 5' 5\" (1.651 m)   Wt 118 lb (53.5 kg)   SpO2 98%   BMI 19.64 kg/m²       General appearance:  No apparent distress, appears stated age and cooperative. HEENT:  Normal cephalic, atraumatic without obvious deformity. Pupils equal, round, and reactive to light. Extra ocular muscles intact. Conjunctivae/corneas clear. Neck: Supple, with full range of motion. No jugular venous distention. Trachea midline. Respiratory:  Normal respiratory effort.  Clear to auscultation, bilaterally without Rales/Wheezes/Rhonchi. Cardiovascular:  Regular rate and rhythm with normal S1/S2 without murmurs, rubs or gallops. Abdomen: Soft, non-tender, non-distended with normal bowel sounds. Musculoskeletal:  No clubbing, cyanosis or edema bilaterally. Full range of motion without deformity. Skin: Skin color, texture, turgor normal.  No rashes or lesions. Neurologic:  Neurovascularly intact without any focal sensory/motor deficits. Cranial nerves: II-XII intact, grossly non-focal.  Psychiatric:  Alert and oriented, thought content appropriate, normal insight  Capillary Refill: Brisk,< 3 seconds   Peripheral Pulses: +2 palpable, equal bilaterally     Labs: For convenience and continuity at follow-up the following most recent labs are provided:    CBC:    Lab Results   Component Value Date    WBC 4.6 04/13/2019    HGB 10.1 04/13/2019    HCT 29.4 04/13/2019     04/13/2019       Renal:    Lab Results   Component Value Date     04/13/2019    K 3.6 04/13/2019    K 3.6 04/13/2019    CL 86 04/13/2019    CO2 27 04/13/2019    BUN 3 04/13/2019    CREATININE <0.5 04/13/2019    CALCIUM 8.0 04/13/2019    PHOS 2.4 04/12/2019     Significant Diagnostic Studies    Radiology:   US GUIDED PARACENTESIS   Final Result   Successful ultrasound guided paracentesis. CT ABDOMEN PELVIS W IV CONTRAST Additional Contrast? None   Final Result   1. Liver findings suggest cirrhosis. 2. Moderate ascites without splenomegaly. 3. Colonic wall thickening is nonspecific in the setting of cirrhosis and   ascites though colitis must be considered.          XR CHEST STANDARD (2 VW)   Final Result   Linear opacification seen at the lung bases bilaterally could represent   atelectasis or pneumonia      Hiatal hernia again noted            Consults:   IP CONSULT TO HOSPITALIST  IP CONSULT TO SPIRITUAL SERVICES  IP CONSULT TO NEPHROLOGY  IP CONSULT TO GI    Disposition: Home     Condition at Discharge: Stable    Discharge Instructions/Follow-up:   Follow-up With Details Why Contact Info   Ryan Jason MD Schedule an appointment as soon as possible for a visit Hospital d/c f/up  [facility], [address] [place] [address] Drive [address]   Tristin Dooley MD  for hospital follow up Renae Marroquin [address] [place] [address]  [phone]       Code Status:  Full Code    Activity: activity as tolerated    Diet: regular diet      Discharge Medications:   Discharge Medication List as of 4/13/2019  3:27 PM           Details   pantoprazole (PROTONIX) 40 MG tablet Take 1 tablet by mouth every morning (before breakfast), Disp-30 tablet, R-3Normal              Details   albuterol sulfate  (90 Base) MCG/ACT inhaler Inhale 2 puffs into the lungs every 6 hours as needed for WheezingHistorical Med      Multiple Vitamins-Minerals (THERAPEUTIC MULTIVITAMIN-MINERALS) tablet Take 1 tablet by mouth daily. Time Spent on discharge is more than 30 minutes in the examination, evaluation, counseling and review of medications and discharge plan. Signed:    Elizabeth Jackson MD   4/13/2019      Thank you Reji Goncalves MD for the opportunity to be involved in this patient's care. If you have any questions or concerns please feel free to contact me at [phone].

## 2019-04-13 NOTE — PROGRESS NOTES
Discharge instructions and medications reviewed with patient and family at bedside. IV and Tele discontinued, patient has no questions at this time. New medication  prescription instructions explained in full. All belongings accounted for. Discharge packet copy sheet signed and placed in chart. Pt wheeled out [facility]. [facility] notified.  Electronically signed by Pinky Ellington RN on 4/13/2019 at 4:20 PM

## 2019-04-13 NOTE — PROGRESS NOTES
--   --   --   --   --  2.4*  --    MG 1.30*  --  1.50*  --   --  1.40*  --    BUN 7  --  6*   < > 5* 5* 3*   CREATININE <0.5*  --  <0.5*   < > <0.5* <0.5* <0.5*   LABGLOM >60  --  >60   < > >60 >60 >60   GFRAA >60  --  >60   < > >60 >60 >60    < > = values in this interval not displayed. Assessment/     Hyponatremia:  Hypervolemic with U osm of 496 and U Na < 20  - Etiology is cirrhosis with superimposed beer potomania ( low solute intake, BUN is 3 )    Cirrhosis:  On CT.  + high SAAG ascites. + varices on EGD    Anemia:  GI following, s/p EGD. Hb is 10    Plan/     Will give 1 x dose of tolvaptan  Could still be discharged  This degree of hyponatremia indicates concerning physiology. Typically would start on spironolactone at this time but we could consider this as outpatient. For now her physiology indicates high ADH levels stimulated by cirrhosis w/ ascites. In this case there is renal vasoconstriction ( low U Na ). Sodium will not act as a solute in this case due to sodium avid kidney. If she can get more protein in the diet then at any level of U osm the kidney could excrete more free water.   Free water restriction is ineffective in this scenario.     -----------------------------  Maximino Denson M.D.   [facility]

## 2019-04-15 LAB — PATH CONSULT FLUID: NORMAL

## 2019-04-29 ENCOUNTER — HOSPITAL ENCOUNTER (OUTPATIENT)
Age: 59
Discharge: HOME | End: 2019-04-29
Payer: MEDICARE

## 2019-04-29 PROCEDURE — 36415 COLL VENOUS BLD VENIPUNCTURE: CPT

## 2019-04-29 PROCEDURE — 80069 RENAL FUNCTION PANEL: CPT

## 2019-04-30 LAB
ALBUMIN SERPL-MCNC: 2.9 G/DL (ref 3.4–5)
ANION GAP SERPL CALCULATED.3IONS-SCNC: 16 MMOL/L (ref 3–16)
BUN BLDV-MCNC: 5 MG/DL (ref 7–20)
CALCIUM SERPL-MCNC: 8.7 MG/DL (ref 8.3–10.6)
CHLORIDE BLD-SCNC: 84 MMOL/L (ref 99–110)
CO2: 22 MMOL/L (ref 21–32)
CREAT SERPL-MCNC: <0.5 MG/DL (ref 0.6–1.1)
GFR AFRICAN AMERICAN: >60
GFR NON-AFRICAN AMERICAN: >60
GLUCOSE BLD-MCNC: 77 MG/DL (ref 70–99)
PHOSPHORUS: 3.3 MG/DL (ref 2.5–4.9)
POTASSIUM SERPL-SCNC: 3.2 MMOL/L (ref 3.5–5.1)
SODIUM BLD-SCNC: 122 MMOL/L (ref 136–145)

## 2022-02-28 ENCOUNTER — APPOINTMENT (OUTPATIENT)
Dept: GENERAL RADIOLOGY | Age: 62
DRG: 180 | End: 2022-02-28
Payer: MEDICARE

## 2022-02-28 ENCOUNTER — HOSPITAL ENCOUNTER (INPATIENT)
Age: 62
LOS: 2 days | Discharge: HOSPICE | DRG: 180 | End: 2022-03-02
Attending: EMERGENCY MEDICINE | Admitting: HOSPITALIST
Payer: MEDICARE

## 2022-02-28 DIAGNOSIS — E83.52 HYPERCALCEMIA: Primary | ICD-10-CM

## 2022-02-28 DIAGNOSIS — R62.7 FAILURE TO THRIVE IN ADULT: ICD-10-CM

## 2022-02-28 PROBLEM — R64 CANCER CACHEXIA (HCC): Status: ACTIVE | Noted: 2022-02-28

## 2022-02-28 PROBLEM — C34.90 SQUAMOUS CELL LUNG CANCER (HCC): Status: ACTIVE | Noted: 2022-02-28

## 2022-02-28 PROBLEM — D64.9 ACUTE ON CHRONIC ANEMIA: Status: ACTIVE | Noted: 2022-02-28

## 2022-02-28 PROBLEM — F10.20 ALCOHOL DEPENDENCE, DAILY USE (HCC): Status: ACTIVE | Noted: 2022-02-28

## 2022-02-28 PROBLEM — E88.09 HYPOALBUMINEMIA: Status: ACTIVE | Noted: 2022-02-28

## 2022-02-28 LAB
A/G RATIO: 0.9 (ref 1.1–2.2)
ACANTHOCYTES: ABNORMAL
ALBUMIN SERPL-MCNC: 2.9 G/DL (ref 3.4–5)
ALP BLD-CCNC: 120 U/L (ref 40–129)
ALT SERPL-CCNC: 15 U/L (ref 10–40)
ANION GAP SERPL CALCULATED.3IONS-SCNC: 13 MMOL/L (ref 3–16)
ANISOCYTOSIS: ABNORMAL
AST SERPL-CCNC: 56 U/L (ref 15–37)
BANDED NEUTROPHILS RELATIVE PERCENT: 16 % (ref 0–7)
BASOPHILS ABSOLUTE: 0 K/UL (ref 0–0.2)
BASOPHILS RELATIVE PERCENT: 0 %
BILIRUB SERPL-MCNC: 2 MG/DL (ref 0–1)
BILIRUBIN URINE: NEGATIVE
BLOOD, URINE: NEGATIVE
BUN BLDV-MCNC: 26 MG/DL (ref 7–20)
BURR CELLS: ABNORMAL
CALCIUM SERPL-MCNC: 16.1 MG/DL (ref 8.3–10.6)
CHLORIDE BLD-SCNC: 85 MMOL/L (ref 99–110)
CLARITY: CLEAR
CO2: 28 MMOL/L (ref 21–32)
COLOR: ABNORMAL
CREAT SERPL-MCNC: <0.5 MG/DL (ref 0.6–1.2)
CRENATED RBC'S: ABNORMAL
EOSINOPHILS ABSOLUTE: 0 K/UL (ref 0–0.6)
EOSINOPHILS RELATIVE PERCENT: 0 %
GFR AFRICAN AMERICAN: >60
GFR NON-AFRICAN AMERICAN: >60
GLUCOSE BLD-MCNC: 90 MG/DL (ref 70–99)
GLUCOSE URINE: NEGATIVE MG/DL
HCT VFR BLD CALC: 30.2 % (ref 36–48)
HEMATOLOGY PATH CONSULT: YES
HEMOGLOBIN: 9.1 G/DL (ref 12–16)
KETONES, URINE: NEGATIVE MG/DL
LACTIC ACID, SEPSIS: <0.2 MMOL/L (ref 0.4–1.9)
LEUKOCYTE ESTERASE, URINE: NEGATIVE
LYMPHOCYTES ABSOLUTE: 0.3 K/UL (ref 1–5.1)
LYMPHOCYTES RELATIVE PERCENT: 2 %
MACROCYTES: ABNORMAL
MCH RBC QN AUTO: 19.7 PG (ref 26–34)
MCHC RBC AUTO-ENTMCNC: 30.1 G/DL (ref 31–36)
MCV RBC AUTO: 65.5 FL (ref 80–100)
MICROCYTES: ABNORMAL
MICROSCOPIC EXAMINATION: ABNORMAL
MONOCYTES ABSOLUTE: 0.5 K/UL (ref 0–1.3)
MONOCYTES RELATIVE PERCENT: 3 %
NEUTROPHILS ABSOLUTE: 16.3 K/UL (ref 1.7–7.7)
NEUTROPHILS RELATIVE PERCENT: 79 %
NITRITE, URINE: NEGATIVE
PDW BLD-RTO: 22.8 % (ref 12.4–15.4)
PH UA: 5.5 (ref 5–8)
PLATELET # BLD: 185 K/UL (ref 135–450)
PLATELET SLIDE REVIEW: ADEQUATE
PMV BLD AUTO: 8.9 FL (ref 5–10.5)
POIKILOCYTES: ABNORMAL
POLYCHROMASIA: ABNORMAL
POTASSIUM REFLEX MAGNESIUM: 4.8 MMOL/L (ref 3.5–5.1)
PROTEIN UA: NEGATIVE MG/DL
RBC # BLD: 4.61 M/UL (ref 4–5.2)
SCHISTOCYTES: ABNORMAL
SLIDE REVIEW: ABNORMAL
SODIUM BLD-SCNC: 126 MMOL/L (ref 136–145)
SPECIFIC GRAVITY UA: 1.02 (ref 1–1.03)
TARGET CELLS: ABNORMAL
TOTAL PROTEIN: 6 G/DL (ref 6.4–8.2)
URINE REFLEX TO CULTURE: ABNORMAL
URINE TYPE: ABNORMAL
UROBILINOGEN, URINE: 1 E.U./DL
VACUOLATED NEUTROPHILS: PRESENT
WBC # BLD: 17.2 K/UL (ref 4–11)

## 2022-02-28 PROCEDURE — 6370000000 HC RX 637 (ALT 250 FOR IP): Performed by: HOSPITALIST

## 2022-02-28 PROCEDURE — 80053 COMPREHEN METABOLIC PANEL: CPT

## 2022-02-28 PROCEDURE — 2580000003 HC RX 258: Performed by: EMERGENCY MEDICINE

## 2022-02-28 PROCEDURE — 83605 ASSAY OF LACTIC ACID: CPT

## 2022-02-28 PROCEDURE — 71045 X-RAY EXAM CHEST 1 VIEW: CPT

## 2022-02-28 PROCEDURE — 94761 N-INVAS EAR/PLS OXIMETRY MLT: CPT

## 2022-02-28 PROCEDURE — 96360 HYDRATION IV INFUSION INIT: CPT

## 2022-02-28 PROCEDURE — 93005 ELECTROCARDIOGRAM TRACING: CPT | Performed by: EMERGENCY MEDICINE

## 2022-02-28 PROCEDURE — 2580000003 HC RX 258: Performed by: HOSPITALIST

## 2022-02-28 PROCEDURE — 85025 COMPLETE CBC W/AUTO DIFF WBC: CPT

## 2022-02-28 PROCEDURE — P9047 ALBUMIN (HUMAN), 25%, 50ML: HCPCS | Performed by: HOSPITALIST

## 2022-02-28 PROCEDURE — 81003 URINALYSIS AUTO W/O SCOPE: CPT

## 2022-02-28 PROCEDURE — 6360000002 HC RX W HCPCS: Performed by: HOSPITALIST

## 2022-02-28 PROCEDURE — 2700000000 HC OXYGEN THERAPY PER DAY

## 2022-02-28 PROCEDURE — 99283 EMERGENCY DEPT VISIT LOW MDM: CPT

## 2022-02-28 PROCEDURE — 96361 HYDRATE IV INFUSION ADD-ON: CPT

## 2022-02-28 PROCEDURE — 1200000000 HC SEMI PRIVATE

## 2022-02-28 RX ORDER — SODIUM CHLORIDE 9 MG/ML
INJECTION, SOLUTION INTRAVENOUS CONTINUOUS
Status: ACTIVE | OUTPATIENT
Start: 2022-02-28 | End: 2022-03-01

## 2022-02-28 RX ORDER — SODIUM CHLORIDE 0.9 % (FLUSH) 0.9 %
10 SYRINGE (ML) INJECTION EVERY 12 HOURS SCHEDULED
Status: DISCONTINUED | OUTPATIENT
Start: 2022-02-28 | End: 2022-03-02 | Stop reason: HOSPADM

## 2022-02-28 RX ORDER — NICOTINE 21 MG/24HR
1 PATCH, TRANSDERMAL 24 HOURS TRANSDERMAL DAILY
Status: DISCONTINUED | OUTPATIENT
Start: 2022-03-01 | End: 2022-03-02 | Stop reason: HOSPADM

## 2022-02-28 RX ORDER — ACETAMINOPHEN 650 MG/1
650 SUPPOSITORY RECTAL EVERY 6 HOURS PRN
Status: DISCONTINUED | OUTPATIENT
Start: 2022-02-28 | End: 2022-03-02 | Stop reason: HOSPADM

## 2022-02-28 RX ORDER — OXYCODONE HYDROCHLORIDE 5 MG/1
5 TABLET ORAL EVERY 4 HOURS PRN
Status: DISCONTINUED | OUTPATIENT
Start: 2022-02-28 | End: 2022-03-01

## 2022-02-28 RX ORDER — POTASSIUM CHLORIDE 20 MEQ/1
40 TABLET, EXTENDED RELEASE ORAL PRN
Status: DISCONTINUED | OUTPATIENT
Start: 2022-02-28 | End: 2022-03-01

## 2022-02-28 RX ORDER — SENNA PLUS 8.6 MG/1
1 TABLET ORAL DAILY PRN
Status: DISCONTINUED | OUTPATIENT
Start: 2022-02-28 | End: 2022-03-02 | Stop reason: HOSPADM

## 2022-02-28 RX ORDER — MAGNESIUM SULFATE IN WATER 40 MG/ML
2000 INJECTION, SOLUTION INTRAVENOUS PRN
Status: DISCONTINUED | OUTPATIENT
Start: 2022-02-28 | End: 2022-03-01

## 2022-02-28 RX ORDER — SODIUM CHLORIDE 0.9 % (FLUSH) 0.9 %
10 SYRINGE (ML) INJECTION PRN
Status: DISCONTINUED | OUTPATIENT
Start: 2022-02-28 | End: 2022-03-02 | Stop reason: HOSPADM

## 2022-02-28 RX ORDER — ONDANSETRON 4 MG/1
4 TABLET, ORALLY DISINTEGRATING ORAL EVERY 8 HOURS PRN
Status: DISCONTINUED | OUTPATIENT
Start: 2022-02-28 | End: 2022-03-02 | Stop reason: HOSPADM

## 2022-02-28 RX ORDER — OXYCODONE HYDROCHLORIDE 5 MG/1
2.5 TABLET ORAL EVERY 4 HOURS PRN
Status: DISCONTINUED | OUTPATIENT
Start: 2022-02-28 | End: 2022-03-01

## 2022-02-28 RX ORDER — ACETAMINOPHEN 325 MG/1
650 TABLET ORAL EVERY 6 HOURS PRN
Status: DISCONTINUED | OUTPATIENT
Start: 2022-02-28 | End: 2022-03-02 | Stop reason: HOSPADM

## 2022-02-28 RX ORDER — ALBUMIN (HUMAN) 12.5 G/50ML
25 SOLUTION INTRAVENOUS EVERY 8 HOURS
Status: COMPLETED | OUTPATIENT
Start: 2022-02-28 | End: 2022-03-01

## 2022-02-28 RX ORDER — 0.9 % SODIUM CHLORIDE 0.9 %
30 INTRAVENOUS SOLUTION INTRAVENOUS ONCE
Status: COMPLETED | OUTPATIENT
Start: 2022-02-28 | End: 2022-02-28

## 2022-02-28 RX ORDER — POTASSIUM CHLORIDE 7.45 MG/ML
10 INJECTION INTRAVENOUS PRN
Status: DISCONTINUED | OUTPATIENT
Start: 2022-02-28 | End: 2022-03-01

## 2022-02-28 RX ORDER — M-VIT,TX,IRON,MINS/CALC/FOLIC 27MG-0.4MG
1 TABLET ORAL DAILY
Status: DISCONTINUED | OUTPATIENT
Start: 2022-03-01 | End: 2022-03-01

## 2022-02-28 RX ORDER — KETOROLAC TROMETHAMINE 30 MG/ML
30 INJECTION, SOLUTION INTRAMUSCULAR; INTRAVENOUS EVERY 6 HOURS PRN
Status: DISCONTINUED | OUTPATIENT
Start: 2022-02-28 | End: 2022-03-01

## 2022-02-28 RX ORDER — ONDANSETRON 2 MG/ML
4 INJECTION INTRAMUSCULAR; INTRAVENOUS EVERY 6 HOURS PRN
Status: DISCONTINUED | OUTPATIENT
Start: 2022-02-28 | End: 2022-03-02 | Stop reason: HOSPADM

## 2022-02-28 RX ORDER — PANTOPRAZOLE SODIUM 40 MG/1
40 TABLET, DELAYED RELEASE ORAL DAILY
Status: DISCONTINUED | OUTPATIENT
Start: 2022-03-01 | End: 2022-03-01

## 2022-02-28 RX ORDER — ALPRAZOLAM 0.25 MG/1
0.5 TABLET ORAL EVERY 4 HOURS PRN
Status: DISCONTINUED | OUTPATIENT
Start: 2022-02-28 | End: 2022-03-01

## 2022-02-28 RX ORDER — SODIUM CHLORIDE 9 MG/ML
25 INJECTION, SOLUTION INTRAVENOUS PRN
Status: DISCONTINUED | OUTPATIENT
Start: 2022-02-28 | End: 2022-03-02 | Stop reason: HOSPADM

## 2022-02-28 RX ADMIN — SODIUM CHLORIDE 1000 ML: 9 INJECTION, SOLUTION INTRAVENOUS at 17:09

## 2022-02-28 RX ADMIN — SODIUM CHLORIDE: 9 INJECTION, SOLUTION INTRAVENOUS at 23:00

## 2022-02-28 RX ADMIN — SODIUM CHLORIDE, PRESERVATIVE FREE 10 ML: 5 INJECTION INTRAVENOUS at 22:59

## 2022-02-28 RX ADMIN — OXYCODONE 5 MG: 5 TABLET ORAL at 20:21

## 2022-02-28 RX ADMIN — ALBUMIN (HUMAN) 25 G: 0.25 INJECTION, SOLUTION INTRAVENOUS at 20:16

## 2022-02-28 ASSESSMENT — PAIN SCALES - GENERAL
PAINLEVEL_OUTOF10: 10
PAINLEVEL_OUTOF10: 8
PAINLEVEL_OUTOF10: 10

## 2022-02-28 NOTE — ED NOTES
1506- called palliative care for consult per Dr. Aishwarya White.    RE: Goals of Care,Code Status Discussion, Family Support, Symptom Management  1612- Nurse Jazmine Guerrier called back and spoke with Dr. Santana Morris  02/28/22 1720

## 2022-02-28 NOTE — CONSULTS
Palliative Care Initial Note  Palliative Care Admit date:  2/28/22  Reason for c/s:  GOC, Code status, Family support, Sx mgt    Advance Directives: In speaking w/ pts mom, she denies pt having executed AD paperwork. In the absence of a HCPOA, pts three children are viewed as her collective NOK and this has been explained to pts mom. Pts three adult children are [address] [place] [address]. Pt currently has a full code status    Plan of care/goals:  Pt was admitted to [address] [place] [address] @ home as of Dec 31, 2021 w/ a terminal dx of Malignant Neoplasm of the lung. Mom reports that pts dtr, Pb, \"was very upset because Burnis Ply hasn't been eating. \"   Apparently, dtr had been encouraging pt to \"get a second opinion and go to the hospital\" and pt had been refusing until today. Earlier today, pt did revoke her hospice benefit but, as long as pts goals are comfort-oriented @ d/c, she can readmit to hospice. Social/Spiritual:  Pt lives w/ her mom and dtr (pb) and dtr's children. Mom reports that pt is \"too weak\" to bear wt or attempt to walk. Mom notes pts very diminished appetite and acknowledges it may well be 2/2 her dx. Plan:  Unclear if pt will be admitted. Whether admitted or not, will plan to f/u w/ pt/family tomorrow. If she is admitted, will advance d/w pt around her goals. In speaking w/ the hospice, they report that dtr, Pb, was having concerning behavior when the [address] [place] [address] nurse was there earlier. It is important to stress that, if pt cannot make her own decisions, her children are her collective [facility] for any decision making. ADDENDUM @ 1600:   Able to speak w/ Syeda Hodge via phone. She shared feeling as though \"my mom is just getting worse, she can hardly talk now and she is always in pain. \"   Syeda Hodge shared that [facility] had an addiction issue\" and does not like that pt takes analgesia for her cancer pain.    Syeda Hodge shared that she and her brother and pts mom prefer to keep pt comfortable, in what they understand is the end of her life, but they are having to deal w/ dissension from [facility] tearfully reports having prior d/w pt and believes pt would not wish to be resuscitated \"it will only cause her more pain. \"   D/w ED provider  It should be noted that this family has had to deal w/ a number of untimely deaths in recent years. Writer made numerous attempts to call pts son, Jean Carlos Govea who drives a truck, and her dtr, Nancy, but n/a w/ either of them.        Reason for consult:  _X_ Advance Care Planning  ___ Transition of Care Planning  _X_ Psychosocial/Spiritual Support  ___ Symptom Management                                                                                                                                                                                      Reinaldo Olmos RN

## 2022-02-28 NOTE — ED PROVIDER NOTES
Emergency Department Provider Note  Location: [address] [place] [address]  ED  2/28/2022     Patient Identification  Sarah Myrick is a 57 y.o. female    Chief Complaint  Failure To Thrive (Hospice pt, comes to house 1x per week; pt has lung CA brought in by caregiver; caregiver sts that she is in pain and has not been eating over last 4 days; pt requested to go to hospital. Pt is having difficulty swallowing and talking. Pt was given oxycodone 5mg at 1125 this morning. )          HPI  (History provided by family member mother)  Patient is a 59-year-old female with history of metastatic lung cancer currently in hospice at home who presents with her mother for failure to thrive symptoms. Mom reports decreased p.o. intake over the past week she is taking 1 or 2 bites per day. Decreased fluid intake as well. She reports increased weakness that is generalized. Patient has become slightly altered and is only making occasional gestures and one-word answers. No fevers or chills difficulty breathing. Patient's daughter has been reluctant to want her to have her prescribed pain medication so she has had limited Percocet over the past week. Her daughter wanted her assessed in the hospital.  There is no advanced care directive and they are undecided between the family members about overall goals of care and CODE STATUS. I have reviewed the following nursing documentation:  Allergies: Allergies   Allergen Reactions    Lorazepam     Morphine Itching       Past medical history:  has a past medical history of Abdominal pain, Carpal tunnel syndrome, Hepatitis C, Hepatitis C, Hypertension, Pancreatitis, Scoliosis, and SVT (supraventricular tachycardia) ([facility] [address] Past surgical history:  has a past surgical history that includes back surgery; Appendectomy; Tubal ligation; Upper gastrointestinal endoscopy (11/4/11); eye surgery (Right, 2/5/15); Colonoscopy; Colonoscopy (11/11);  Colonoscopy (27 Jan 2016); endoscopic ultrasound (upper) (08/24/2016); and Upper gastrointestinal endoscopy (N/A, 4/12/2019). Home medications:   Prior to Admission medications    Medication Sig Start Date End Date Taking? Authorizing Provider   pantoprazole (PROTONIX) 40 MG tablet Take 1 tablet by mouth every morning (before breakfast) 4/13/19   Pau Roberto MD   albuterol sulfate  (90 Base) MCG/ACT inhaler Inhale 2 puffs into the lungs every 6 hours as needed for Wheezing    Historical Provider, MD   Multiple Vitamins-Minerals (THERAPEUTIC MULTIVITAMIN-MINERALS) tablet Take 1 tablet by mouth daily. Historical Provider, MD   pantoprazole (PROTONIX) 20 MG tablet Take 2 tablets by mouth daily for 360 days. 11/2/11 10/27/12  Judith Freeman MD       Social history:  reports that she has quit smoking. Her smoking use included cigarettes. She has a 0.25 pack-year smoking history. She has never used smokeless tobacco. She reports previous alcohol use. She reports that she does not use drugs. Family history:    Family History   Problem Relation Age of Onset    Cancer Father         small cell    Cancer Brother         small cell    Arthritis Mother     High Blood Pressure Mother          ROS  Review of Systems   Unable to perform ROS: Mental status change         Exam  ED Triage Vitals [02/28/22 1349]   BP Temp Temp Source Pulse Resp SpO2 Height Weight   137/82 96.4 °F (35.8 °C) Oral 108 20 96 % -- --       Physical Exam  Vitals and nursing note reviewed. Constitutional:       General: She is not in acute distress. Appearance: She is well-developed. She is ill-appearing. Comments: Cachectic   HENT:      Head: Normocephalic and atraumatic. Nose: Nose normal. No congestion. Eyes:      Extraocular Movements: Extraocular movements intact. Pupils: Pupils are equal, round, and reactive to light. Cardiovascular:      Rate and Rhythm: Normal rate and regular rhythm.       Heart sounds: No murmur heard.      Pulmonary:      Effort: Pulmonary effort is normal.      Breath sounds: Normal breath sounds. Abdominal:      General: There is no distension. Palpations: Abdomen is soft. Tenderness: There is no abdominal tenderness. Musculoskeletal:         General: No deformity. Normal range of motion. Cervical back: Normal range of motion and neck supple. No rigidity or tenderness. Skin:     General: Skin is warm. Findings: No rash. Neurological:      Motor: No abnormal muscle tone. Comments: Altered but protecting airway GCS 12   Psychiatric:         Mood and Affect: Mood normal.         Behavior: Behavior normal.           ED Course    ED Medication Orders (From admission, onward)    Start Ordered     Status Ordering Provider    02/28/22 1630 02/28/22 1630  0.9 % sodium chloride IV bolus 30 mL/kg  ONCE         Last MAR action: Stopped - by Jaun Seal on 02/28/22 at [address] [facility] [place]          EKG  Normal sinus rhythm with occasional PVCs rate 100 normal axis normal intervals no evidence of conduction abnormalities no diagnostic ischemic changes noted, nonspecific ST variations septal inferior leads       Radiology  XR CHEST PORTABLE    Result Date: 2/28/2022  EXAMINATION: ONE XRAY VIEW OF THE CHEST 2/28/2022 5:52 pm COMPARISON: 04/10/2019. More recent outside imaging is not available at time of dictation. HISTORY: ORDERING SYSTEM PROVIDED HISTORY: FTT TECHNOLOGIST PROVIDED HISTORY: Reason for exam:->FTT Reason for Exam: FTT FINDINGS: Cardiomediastinal silhouette appears within normal limits. Low lung volumes. Rounded opacity in the medial left upper lung zone and ovoid opacity in the right lower lung zone. Costophrenic angles are sharp. No evidence of pneumothorax. No acute osseous abnormalities. Thoracic dextroscoliosis status post overlying posterior instrumented fusion. Bilateral pulmonary masses compatible with known history of lung cancer. Labs  Results for orders placed or performed during the hospital encounter of 02/28/22   CBC with Auto Differential   Result Value Ref Range    WBC 17.2 (H) 4.0 - 11.0 K/uL    RBC 4.61 4.00 - 5.20 M/uL    Hemoglobin 9.1 (L) 12.0 - 16.0 g/dL    Hematocrit 30.2 (L) 36.0 - 48.0 %    MCV 65.5 (L) 80.0 - 100.0 fL    MCH 19.7 (L) 26.0 - 34.0 pg    MCHC 30.1 (L) 31.0 - 36.0 g/dL    RDW 22.8 (H) 12.4 - 15.4 %    Platelets 718 204 - 051 K/uL    MPV 8.9 5.0 - 10.5 fL    PLATELET SLIDE REVIEW Adequate     SLIDE REVIEW see below     Path Consult Yes     Neutrophils % 79.0 %    Lymphocytes % 2.0 %    Monocytes % 3.0 %    Eosinophils % 0.0 %    Basophils % 0.0 %    Neutrophils Absolute 16.3 (H) 1.7 - 7.7 K/uL    Lymphocytes Absolute 0.3 (L) 1.0 - 5.1 K/uL    Monocytes Absolute 0.5 0.0 - 1.3 K/uL    Eosinophils Absolute 0.0 0.0 - 0.6 K/uL    Basophils Absolute 0.0 0.0 - 0.2 K/uL    Bands Relative 16 (H) 0 - 7 %    Vacuolated Neutrophils Present (A)     Anisocytosis 1+ (A)     Macrocytes Occasional (A)     Microcytes Occasional (A)     Polychromasia Occasional (A)     Poikilocytes Occasional (A)     Schistocytes Occasional (A)     Acanthocytes Occasional (A)     Basalt Cells Occasional (A)     CRENATED RBC'S Occasional (A)     Target Cells Occasional (A)    Comprehensive Metabolic Panel w/ Reflex to MG   Result Value Ref Range    Sodium 126 (L) 136 - 145 mmol/L    Potassium reflex Magnesium 4.8 3.5 - 5.1 mmol/L    Chloride 85 (L) 99 - 110 mmol/L    CO2 28 21 - 32 mmol/L    Anion Gap 13 3 - 16    Glucose 90 70 - 99 mg/dL    BUN 26 (H) 7 - 20 mg/dL    CREATININE <0.5 (L) 0.6 - 1.2 mg/dL    GFR Non-African American >60 >60    GFR African American >60 >60    Calcium 16.1 (HH) 8.3 - 10.6 mg/dL    Total Protein 6.0 (L) 6.4 - 8.2 g/dL    Albumin 2.9 (L) 3.4 - 5.0 g/dL    Albumin/Globulin Ratio 0.9 (L) 1.1 - 2.2    Total Bilirubin 2.0 (H) 0.0 - 1.0 mg/dL    Alkaline Phosphatase 120 40 - 129 U/L    ALT 15 10 - 40 U/L    AST 56 (H) 15 - 37 U/L   Urinalysis with Reflex to Culture    Specimen: Urine   Result Value Ref Range    Color, UA DARK YELLOW (A) Straw/Yellow    Clarity, UA Clear Clear    Glucose, Ur Negative Negative mg/dL    Bilirubin Urine Negative Negative    Ketones, Urine Negative Negative mg/dL    Specific Gravity, UA 1.025 1.005 - 1.030    Blood, Urine Negative Negative    pH, UA 5.5 5.0 - 8.0    Protein, UA Negative Negative mg/dL    Urobilinogen, Urine 1.0 <2.0 E.U./dL    Nitrite, Urine Negative Negative    Leukocyte Esterase, Urine Negative Negative    Microscopic Examination Not Indicated     Urine Type NotGiven     Urine Reflex to Culture Not Indicated    Lactate, Sepsis   Result Value Ref Range    Lactic Acid, Sepsis <0.2 (L) 0.4 - 1.9 mmol/L   EKG 12 Lead   Result Value Ref Range    Ventricular Rate 101 BPM    Atrial Rate 101 BPM    P-R Interval 136 ms    QRS Duration 84 ms    Q-T Interval 314 ms    QTc Calculation (Bazett) 407 ms    P Axis 65 degrees    R Axis 69 degrees    T Axis -38 degrees    Diagnosis       Sinus tachycardia with occasional Premature ventricular complexesST & T wave abnormality, consider anterior ischemiaAbnormal ECGWhen compared with ECG of 10-APR-2019 20:58,Premature ventricular complexes are now PresentPremature supraventricular complexes are no longer PresentNonspecific T wave abnormality, worse in Inferior leadsT wave inversion now evident in Anterior leads         MDM  Patient seen and evaluated. Relevant records reviewed. 76-year-old female who presents with failure to thrive in the setting of advanced metastatic disease. Patient is chronically ill-appearing. Her vital signs significant for borderline tachycardia. She has no focal findings otherwise on exam.  Her labs showing severe hyperkalemia likely in the setting of dehydration. I am starting her on IV fluid resuscitation.   After discussion with daughter on phone and mother who is at bedside there is an overall disagreement about the goals of care. We will treat her pain and I have consulted palliative to further discuss. We have decided that we will do basic metabolic work-up and treat any treatable illness such as infection or electrolyte derangements in the meantime. Clinical Impression:  1. Hypercalcemia    2. Failure to thrive in adult          Disposition:  Admit to telemetry in guarded condition. Blood pressure 115/83, pulse 103, temperature 96.4 °F (35.8 °C), temperature source Oral, resp. rate 14, weight 90 lb (40.8 kg), SpO2 94 %. Patient was given scripts for the following medications. I counseled patient how to take these medications. New Prescriptions    No medications on file       Disposition referral (if applicable):  No follow-up provider specified. Total critical care time is 35 minutes, which excludes separately billable procedures and updating family. Time spent is specifically for management of the presenting complaint and symptoms initially, direct bedside care, reevaluation, review of records, and consultation. There was a high probability of clinically significant life-threatening deterioration in the patient's condition, which required my urgent intervention. This chart was generated in part by using Dragon Dictation system and may contain errors related to that system including errors in grammar, punctuation, and spelling, as well as words and phrases that may be inappropriate. If there are any questions or concerns please feel free to contact the dictating provider for clarification.      Evelin Gómez MD  [address] MD  02/28/22 9513

## 2022-03-01 ENCOUNTER — APPOINTMENT (OUTPATIENT)
Dept: CT IMAGING | Age: 62
DRG: 180 | End: 2022-03-01
Payer: MEDICARE

## 2022-03-01 ENCOUNTER — APPOINTMENT (OUTPATIENT)
Dept: GENERAL RADIOLOGY | Age: 62
DRG: 180 | End: 2022-03-01
Payer: MEDICARE

## 2022-03-01 PROBLEM — E43 SEVERE PROTEIN-CALORIE MALNUTRITION (GOMEZ: LESS THAN 60% OF STANDARD WEIGHT) (HCC): Status: ACTIVE | Noted: 2022-03-01

## 2022-03-01 LAB
A/G RATIO: 1.4 (ref 1.1–2.2)
ALBUMIN SERPL-MCNC: 3.4 G/DL (ref 3.4–5)
ALP BLD-CCNC: 95 U/L (ref 40–129)
ALT SERPL-CCNC: 10 U/L (ref 10–40)
ANION GAP SERPL CALCULATED.3IONS-SCNC: 14 MMOL/L (ref 3–16)
AST SERPL-CCNC: 19 U/L (ref 15–37)
BASE EXCESS ARTERIAL: 8.5 MMOL/L (ref -3–3)
BASE EXCESS VENOUS: 8.4 MMOL/L (ref -3–3)
BASOPHILS ABSOLUTE: 0 K/UL (ref 0–0.2)
BASOPHILS RELATIVE PERCENT: 0.1 %
BILIRUB SERPL-MCNC: 2.2 MG/DL (ref 0–1)
BUN BLDV-MCNC: 23 MG/DL (ref 7–20)
CALCIUM IONIZED: 1.57 MMOL/L (ref 1.12–1.32)
CALCIUM SERPL-MCNC: 15.4 MG/DL (ref 8.3–10.6)
CALCIUM SERPL-MCNC: 16.2 MG/DL (ref 8.3–10.6)
CARBOXYHEMOGLOBIN ARTERIAL: 0.7 % (ref 0–1.5)
CARBOXYHEMOGLOBIN: 8.5 % (ref 0–1.5)
CHLORIDE BLD-SCNC: 90 MMOL/L (ref 99–110)
CO2: 29 MMOL/L (ref 21–32)
CREAT SERPL-MCNC: <0.5 MG/DL (ref 0.6–1.2)
EKG ATRIAL RATE: 101 BPM
EKG DIAGNOSIS: NORMAL
EKG P AXIS: 65 DEGREES
EKG P-R INTERVAL: 136 MS
EKG Q-T INTERVAL: 314 MS
EKG QRS DURATION: 84 MS
EKG QTC CALCULATION (BAZETT): 407 MS
EKG R AXIS: 69 DEGREES
EKG T AXIS: -38 DEGREES
EKG VENTRICULAR RATE: 101 BPM
EOSINOPHILS ABSOLUTE: 0 K/UL (ref 0–0.6)
EOSINOPHILS RELATIVE PERCENT: 0 %
FOLATE: 11.59 NG/ML (ref 4.78–24.2)
GFR AFRICAN AMERICAN: >60
GFR NON-AFRICAN AMERICAN: >60
GLUCOSE BLD-MCNC: 88 MG/DL (ref 70–99)
HCO3 ARTERIAL: 32.8 MMOL/L (ref 21–29)
HCO3 VENOUS: 30.2 MMOL/L (ref 23–29)
HCT VFR BLD CALC: 26 % (ref 36–48)
HEMATOLOGY PATH CONSULT: NO
HEMATOLOGY PATH CONSULT: NORMAL
HEMOGLOBIN, ART, EXTENDED: 8.7 G/DL (ref 12–16)
HEMOGLOBIN: 7.9 G/DL (ref 12–16)
IRON SATURATION: 6 % (ref 15–50)
IRON: 13 UG/DL (ref 37–145)
LYMPHOCYTES ABSOLUTE: 0.3 K/UL (ref 1–5.1)
LYMPHOCYTES RELATIVE PERCENT: 2.2 %
MAGNESIUM: 1.2 MG/DL (ref 1.8–2.4)
MCH RBC QN AUTO: 19.8 PG (ref 26–34)
MCHC RBC AUTO-ENTMCNC: 30.4 G/DL (ref 31–36)
MCV RBC AUTO: 65.2 FL (ref 80–100)
METHEMOGLOBIN ARTERIAL: 0.5 %
METHEMOGLOBIN VENOUS: 0.3 %
MONOCYTES ABSOLUTE: 0.3 K/UL (ref 0–1.3)
MONOCYTES RELATIVE PERCENT: 2 %
NEUTROPHILS ABSOLUTE: 14.7 K/UL (ref 1.7–7.7)
NEUTROPHILS RELATIVE PERCENT: 95.7 %
O2 SAT, ARTERIAL: 94.6 %
O2 SAT, VEN: 99 %
O2 THERAPY: ABNORMAL
O2 THERAPY: ABNORMAL
PARATHYROID HORMONE INTACT: 17.9 PG/ML (ref 14–72)
PCO2 ARTERIAL: 45 MMHG (ref 35–45)
PCO2, VEN: 31.3 MMHG (ref 40–50)
PDW BLD-RTO: 22.4 % (ref 12.4–15.4)
PH ARTERIAL: 7.48 (ref 7.35–7.45)
PH VENOUS: 7.6 (ref 7.35–7.45)
PH VENOUS: 7.65 (ref 7.35–7.45)
PLATELET # BLD: 164 K/UL (ref 135–450)
PMV BLD AUTO: 8.3 FL (ref 5–10.5)
PO2 ARTERIAL: 77.8 MMHG (ref 75–108)
PO2, VEN: 191.4 MMHG (ref 25–40)
POTASSIUM REFLEX MAGNESIUM: 2.9 MMOL/L (ref 3.5–5.1)
PREALBUMIN: 3.4 MG/DL (ref 20–40)
PROCALCITONIN: 0.31 NG/ML (ref 0–0.15)
PROCALCITONIN: 0.32 NG/ML (ref 0–0.15)
RBC # BLD: 3.98 M/UL (ref 4–5.2)
SODIUM BLD-SCNC: 133 MMOL/L (ref 136–145)
T4 FREE: 0.8 NG/DL (ref 0.9–1.8)
TCO2 ARTERIAL: 34.2 MMOL/L
TCO2 CALC VENOUS: 31 MMOL/L
TOTAL IRON BINDING CAPACITY: 202 UG/DL (ref 260–445)
TOTAL PROTEIN: 5.9 G/DL (ref 6.4–8.2)
TSH SERPL DL<=0.05 MIU/L-ACNC: 3.06 UIU/ML (ref 0.27–4.2)
VITAMIN B-12: >2000 PG/ML (ref 211–911)
VITAMIN D 25-HYDROXY: 36.7 NG/ML
VITAMIN D 25-HYDROXY: 38.2 NG/ML
WBC # BLD: 15.3 K/UL (ref 4–11)

## 2022-03-01 PROCEDURE — 84439 ASSAY OF FREE THYROXINE: CPT

## 2022-03-01 PROCEDURE — 2700000000 HC OXYGEN THERAPY PER DAY

## 2022-03-01 PROCEDURE — 36600 WITHDRAWAL OF ARTERIAL BLOOD: CPT

## 2022-03-01 PROCEDURE — 6360000002 HC RX W HCPCS: Performed by: HOSPITALIST

## 2022-03-01 PROCEDURE — P9047 ALBUMIN (HUMAN), 25%, 50ML: HCPCS | Performed by: HOSPITALIST

## 2022-03-01 PROCEDURE — 99223 1ST HOSP IP/OBS HIGH 75: CPT | Performed by: HOSPITALIST

## 2022-03-01 PROCEDURE — 80053 COMPREHEN METABOLIC PANEL: CPT

## 2022-03-01 PROCEDURE — 84134 ASSAY OF PREALBUMIN: CPT

## 2022-03-01 PROCEDURE — 70450 CT HEAD/BRAIN W/O DYE: CPT

## 2022-03-01 PROCEDURE — 82330 ASSAY OF CALCIUM: CPT

## 2022-03-01 PROCEDURE — 82310 ASSAY OF CALCIUM: CPT

## 2022-03-01 PROCEDURE — 6360000002 HC RX W HCPCS: Performed by: NURSE PRACTITIONER

## 2022-03-01 PROCEDURE — 83550 IRON BINDING TEST: CPT

## 2022-03-01 PROCEDURE — 82306 VITAMIN D 25 HYDROXY: CPT

## 2022-03-01 PROCEDURE — 82308 ASSAY OF CALCITONIN: CPT

## 2022-03-01 PROCEDURE — 84443 ASSAY THYROID STIM HORMONE: CPT

## 2022-03-01 PROCEDURE — 93010 ELECTROCARDIOGRAM REPORT: CPT | Performed by: INTERNAL MEDICINE

## 2022-03-01 PROCEDURE — 83735 ASSAY OF MAGNESIUM: CPT

## 2022-03-01 PROCEDURE — 1200000000 HC SEMI PRIVATE

## 2022-03-01 PROCEDURE — 2580000003 HC RX 258: Performed by: HOSPITALIST

## 2022-03-01 PROCEDURE — 84145 PROCALCITONIN (PCT): CPT

## 2022-03-01 PROCEDURE — 82746 ASSAY OF FOLIC ACID SERUM: CPT

## 2022-03-01 PROCEDURE — 82542 COL CHROMOTOGRAPHY QUAL/QUAN: CPT

## 2022-03-01 PROCEDURE — 94761 N-INVAS EAR/PLS OXIMETRY MLT: CPT

## 2022-03-01 PROCEDURE — 85025 COMPLETE CBC W/AUTO DIFF WBC: CPT

## 2022-03-01 PROCEDURE — 83970 ASSAY OF PARATHORMONE: CPT

## 2022-03-01 PROCEDURE — 71045 X-RAY EXAM CHEST 1 VIEW: CPT

## 2022-03-01 PROCEDURE — 6370000000 HC RX 637 (ALT 250 FOR IP): Performed by: INTERNAL MEDICINE

## 2022-03-01 PROCEDURE — 82803 BLOOD GASES ANY COMBINATION: CPT

## 2022-03-01 PROCEDURE — 83540 ASSAY OF IRON: CPT

## 2022-03-01 PROCEDURE — 36415 COLL VENOUS BLD VENIPUNCTURE: CPT

## 2022-03-01 PROCEDURE — 82607 VITAMIN B-12: CPT

## 2022-03-01 RX ORDER — OXYCODONE HCL 5 MG/5 ML
10 SOLUTION, ORAL ORAL
Status: DISCONTINUED | OUTPATIENT
Start: 2022-03-01 | End: 2022-03-02 | Stop reason: HOSPADM

## 2022-03-01 RX ORDER — OXYCODONE HCL 5 MG/5 ML
5 SOLUTION, ORAL ORAL
Status: DISCONTINUED | OUTPATIENT
Start: 2022-03-01 | End: 2022-03-02 | Stop reason: HOSPADM

## 2022-03-01 RX ORDER — LORAZEPAM 2 MG/ML
0.5 CONCENTRATE ORAL EVERY 4 HOURS PRN
Status: DISCONTINUED | OUTPATIENT
Start: 2022-03-01 | End: 2022-03-02 | Stop reason: HOSPADM

## 2022-03-01 RX ORDER — CALCITONIN SALMON 200 [USP'U]/ML
200 INJECTION, SOLUTION INTRAMUSCULAR; SUBCUTANEOUS 2 TIMES DAILY
Status: DISCONTINUED | OUTPATIENT
Start: 2022-03-01 | End: 2022-03-02 | Stop reason: HOSPADM

## 2022-03-01 RX ORDER — MAGNESIUM SULFATE IN WATER 40 MG/ML
4000 INJECTION, SOLUTION INTRAVENOUS ONCE
Status: COMPLETED | OUTPATIENT
Start: 2022-03-01 | End: 2022-03-01

## 2022-03-01 RX ADMIN — MAGNESIUM SULFATE HEPTAHYDRATE 4000 MG: 40 INJECTION, SOLUTION INTRAVENOUS at 12:12

## 2022-03-01 RX ADMIN — LORAZEPAM 0.5 MG: 2 SOLUTION, CONCENTRATE ORAL at 21:49

## 2022-03-01 RX ADMIN — KETOROLAC TROMETHAMINE 30 MG: 30 INJECTION, SOLUTION INTRAMUSCULAR; INTRAVENOUS at 10:02

## 2022-03-01 RX ADMIN — CALCITONIN SALMON 200 UNITS: 200 INJECTION, SOLUTION INTRAMUSCULAR; SUBCUTANEOUS at 12:11

## 2022-03-01 RX ADMIN — ALBUMIN (HUMAN) 25 G: 0.25 INJECTION, SOLUTION INTRAVENOUS at 03:26

## 2022-03-01 RX ADMIN — OXYCODONE HYDROCHLORIDE 5 MG: 5 SOLUTION ORAL at 13:22

## 2022-03-01 RX ADMIN — SODIUM CHLORIDE, PRESERVATIVE FREE 10 ML: 5 INJECTION INTRAVENOUS at 21:27

## 2022-03-01 RX ADMIN — OXYCODONE HYDROCHLORIDE 5 MG: 5 SOLUTION ORAL at 17:00

## 2022-03-01 RX ADMIN — OXYCODONE HYDROCHLORIDE 10 MG: 5 SOLUTION ORAL at 20:24

## 2022-03-01 RX ADMIN — CALCITONIN SALMON 200 UNITS: 200 INJECTION, SOLUTION INTRAMUSCULAR; SUBCUTANEOUS at 21:25

## 2022-03-01 RX ADMIN — OXYCODONE HYDROCHLORIDE 5 MG: 5 SOLUTION ORAL at 17:41

## 2022-03-01 ASSESSMENT — PAIN SCALES - GENERAL
PAINLEVEL_OUTOF10: 8
PAINLEVEL_OUTOF10: 0
PAINLEVEL_OUTOF10: 5
PAINLEVEL_OUTOF10: 0
PAINLEVEL_OUTOF10: 6

## 2022-03-01 NOTE — H&P
HOSPITALISTS HISTORY AND PHYSICAL    2/28/2022 8:10 PM    Patient Information:  Autumn Guzman is a 57 y.o. female [id number]  PCP:  Mary Ramos MD (Tel: [phone] )    Chief complaint:    Chief Complaint   Patient presents with    Failure To Thrive     Hospice pt, comes to house 1x per week; pt has lung CA brought in by caregiver; caregiver sts that she is in pain and has not been eating over last 4 days; pt requested to go to hospital. Pt is having difficulty swallowing and talking. Pt was given oxycodone 5mg at 1125 this morning. History of Present Illness:  Mirza Cortez is a 57 y.o. female who presented to the ED per recommendations of her hospice nurse to be evaluated for increasing FTT in the setting of advanced squamous cell lung CA. At the time of examination no family members are present therefore HPI is limited in this individual who is acutely altered and struggling to speak. Hospice caregiver reports a 1 week history of increasing lethargy, decreased p.o. intake, dysphagia, and anarthria. Review of PCP note from 1/18/2022 reveals that patient was diagnosed just before Christmas with invasive poorly differentiated lung CA for which she declined further evaluation and treatment. She subsequently entered hospice on December 30. Epic note states that she was made DNR on January 3, however no paperwork is available at this time to confirm change in [facility]. Upon arrival to the ED EKG was obtained revealing sinus tachycardia with PVCs and ST/T wave abnormality concerning for anterior ischemia. CXR notable for bilateral pulmonary masses consistent with known history of lung CA. Notable labs include: Chronic hyponatremia 126, hypercalcemia 16.1, hypoalbuminemia 2.9, microcytic anemia, left shifted leukocytosis, and UA with SG 1.025.   Patient was initiated on NS IVF bolus by ED provider to address her hypercalcemia of malignancy. Stat consult to nephrology placed by hospitalist for further recommendations. History obtained from patient and review of Epic chart    REVIEW OF SYSTEMS: Limited to acute encephalopathy  Constitutional: Negative for fever,chills; positive generalized weakness  ENT: Negative for headache, rhinorrhea, and sore throat. Respiratory: Negative for shortness of breath, wheezing, and cough  Cardiovascular: Negative for chest pain, palpitations, peripheral edema, orthopnea or PND  Gastrointestinal: Decreased p.o. intake and anorexia; positive dysphagia; negative for N/V/D and abdominal pain; no hematemesis, hematochezia, or melena  Genitourinary: Negative for dysuria, frequency, retention; no incontinence  Hematologic/Lymphatic: Negative for bleeding tendency/excessive bruising  Musculoskeletal: Positive for acute myalgias and arthalgias; unable to ambulate without difficulty  Neurologic: Delayed mentation; negative for LOC, seizure activity  Skin: Negative for itching,rash, decubitus  Psychiatric: Depressed blunted affect  Endocrine: Negative for polyuria/polydipsia/polyphagia; no heat/cold intolerance    Past Medical History:   has a past medical history of Abdominal pain, Carpal tunnel syndrome, Hepatitis C, Hepatitis C, Hypertension, Pancreatitis, Scoliosis, and SVT (supraventricular tachycardia) ([facility] [address] Past Surgical History:   has a past surgical history that includes back surgery; Appendectomy; Tubal ligation; Upper gastrointestinal endoscopy (11/4/11); eye surgery (Right, 2/5/15); Colonoscopy; Colonoscopy (11/11); Colonoscopy (27 Jan 2016); endoscopic ultrasound (upper) (08/24/2016); and Upper gastrointestinal endoscopy (N/A, 4/12/2019). Medications:  No current facility-administered medications on file prior to encounter.      Current Outpatient Medications on File Prior to Encounter   Medication Sig Dispense Refill    pantoprazole (PROTONIX) 40 MG tablet Take 1 tablet by mouth every morning (before breakfast) 30 tablet 3    albuterol sulfate  (90 Base) MCG/ACT inhaler Inhale 2 puffs into the lungs every 6 hours as needed for Wheezing      Multiple Vitamins-Minerals (THERAPEUTIC MULTIVITAMIN-MINERALS) tablet Take 1 tablet by mouth daily.  pantoprazole (PROTONIX) 20 MG tablet Take 2 tablets by mouth daily for 360 days. 30 tablet 0       Allergies: Allergies   Allergen Reactions    Lorazepam     Morphine Itching        Social History:   reports that she has quit smoking. Her smoking use included cigarettes. She has a 0.25 pack-year smoking history. She has never used smokeless tobacco. She reports previous alcohol use. She reports that she does not use drugs. Family History:  family history includes Arthritis in her mother; Cancer in her brother and father; High Blood Pressure in her mother.      Physical Exam:  /83   Pulse 103   Temp 96.4 °F (35.8 °C) (Oral)   Resp 14   Wt 90 lb (40.8 kg)   SpO2 94%   BMI 14.98 kg/m²     General appearance: Cachectic chronically ill-appearing female  Eyes: Sclera clear without conjunctival injection; PERRLA; EOMI  ENT: Mucous membranes dry without thrush; normal dentition  Neck: Supple without meningismus; no goiter; no carotid bruit bilaterally  Cardiovascular: Regular rhythm without ectopy; normal S1-S2 with no murmurs; 1+ BLE peripheral edema; no JVD  Respiratory: Mild tachypnea; diminished bibasilar breath sounds   gastrointestinal: Abdomen soft, non-tender, not distended; bowel sounds hypoactive; no masses/organomegaly appreciated  Musculoskeletal: Notable muscle wasting; FROM spine and extremities x4  Neurology: Alert but disoriented; cranial nerves 2-12 grossly intact; unable to follow commands  Psychiatry: Poor eye contact with blunted affect   skin: Warm, dry, no rash  PV: 1/4 radial and dorsalis pedis bilaterally; delayed capillary refill    Labs:  CBC:   Lab Results   Component Value Date    WBC 17.2 02/28/2022    RBC 4.61 02/28/2022    HGB 9.1 02/28/2022    HCT 30.2 02/28/2022    MCV 65.5 02/28/2022    MCH 19.7 02/28/2022    MCHC 30.1 02/28/2022    RDW 22.8 02/28/2022     02/28/2022    MPV 8.9 02/28/2022     BMP:    Lab Results   Component Value Date     02/28/2022    K 4.8 02/28/2022    CL 85 02/28/2022    CO2 28 02/28/2022    BUN 26 02/28/2022    CREATININE <0.5 02/28/2022    CALCIUM 16.1 02/28/2022    GFRAA >60 02/28/2022    GFRAA >60 01/23/2013    LABGLOM >60 02/28/2022    GLUCOSE 90 02/28/2022     XR CHEST PORTABLE   Final Result   Bilateral pulmonary masses compatible with known history of lung cancer. EKG: Ventricular Rate 101 P BPM QTc Calculation (Bazett) 407 P ms   Atrial Rate 101 P BPM P Axis 65 P degrees   P-R Interval 136 P ms R Axis 69 P degrees   QRS Duration 84 P ms T Axis -38 P degrees   Q-T Interval 314 P ms Diagnosis Sinus tachycardia with occasional Premature ventricular complexesST & T wave abnormality, conside. .. P       I visualized CXR images and EKG strips personally and agree with documented interpretation    Discussed case  with ED provider    Problem List:  Principal Problem:    FTT (failure to thrive) in adult  Active Problems:    Chronic hepatitis C virus infection ([facility] [address]    Tobacco dependence syndrome    Squamous cell lung cancer ([facility] [address]    Cancer cachexia (HCC)    Hypercalcemia    Hypoalbuminemia    Acute on chronic anemia  Resolved Problems:    * No resolved hospital problems.  *        Consults:  IP CONSULT TO PALLIATIVE CARE  IP CONSULT TO HOSPITALIST  IP CONSULT TO HOSPICE  IP CONSULT TO PALLIATIVE CARE  IP CONSULT TO NEPHROLOGY      Assessment/Plan:     Squamous cell lung CA with adult FTT  -Fall risk protocol in place with bed alarm activated  -TSH/ free T4, vitamin D, B12/folate, prealbumin and A1c lab work ordered with results currently pending  -PCP notes suggest CODE STATUS changed to DNR on January 3, 2022; this must be confirmed and determined if DNR CCA or DNR CC  -Consultation placed to palliative care and hospice to assist with CODE STATUS discussion, goals of care, and end-of-life comfort measures  -OxyIR and Xanax continued per home dosages to address pain and anxiety issues, respectively  -Head CT ordered overnight to further evaluate acute encephalopathy and rule out CVA versus intracranial metastatic spread  -Oncology consult deferred due to patient's prior decision to not seek treatment for her lung cancer    Hypercalcemia  -Likely multifactorial including hypercalcemia of malignancy as well as acute dehydration due to decreased p.o. intake  -Suspect that patient's acute encephalopathy and GI motility issues are secondary to this electrolyte disturbance  -Patient aggressively hydrated with NS overnight with serial calcium level measurement  -Orders placed for PTH, PTH RP, calcitonin, and vitamin D  -Consult placed to NEPHRO to determine if bisphosphonate or calcitonin therapy may be beneficial; suspect patient is not a candidate for dialysis therapy    Acute on chronic anemia  -Admit to telemetry unit with serial H/H scheduled  -Type and screen ordered with PRBC transfusion for hemoglobin less than 7 (based on CODE STATUS determination)  -Coags, RBC indices, iron studies, and B12/folate levels ordered; FOBT testing QD x3    DVT prophylaxis-Lovenox 40 mg subcu daily  Code status-full code  Diet-easy to chew regular diet as tolerated  IV access-PIV established in ED      Admit as inpatient. I anticipate hospitalization spanning more than two midnights for investigation and treatment of the above medically necessary diagnoses. Comment: Please note this report has been produced using speech recognition software and may contain errors related to that system including errors in grammar, punctuation, and spelling, as well as words and phrases that may be inappropriate.  If there are any questions or concerns please feel free to contact the dictating provider for clarification.          Chucky Dasilva MD    2/28/2022 8:10 PM

## 2022-03-01 NOTE — CONSULTS
Consult PerfectServed/called to [facility] on 03/01/22 at 9:19 AM Renuka Castellanos

## 2022-03-01 NOTE — ED NOTES
Pt given home dose of oxycodone crushed with apple sauce. Attempt to change pt out of street clothing into gown. Pt moaning and crying with movent. Mother requested that pt not be changed or moved at this time due to her pain.        El Harris RN  02/28/22 2038

## 2022-03-01 NOTE — ED NOTES
Pt failed Pure Wick, Pt bed saturated with urine at this time. Mendiola catheter placed at this time.        El Harris RN  02/28/22 2128

## 2022-03-01 NOTE — PROGRESS NOTES
Palliative Care Progress Note  Palliative Care Admit date:  2/28/22    Advance Directives:   DNR-CC    Social/Spiritual:  W/ the urging of pts mom, placed call to [facility] [facility].   While she did answer the phone and was polite in our brief conversation, she was immediately tearful at my introduction and requested to hang up until she could collect herself. Plan:  Nancy stated she would return call to writer so will await her call.             Reason for consult:  ___ Advance Care Planning  ___ Transition of Care Planning  _X_ Psychosocial/Spiritual Support  ___ Symptom Management                                                                                                                                                                  Nathalia Merrill RN

## 2022-03-01 NOTE — ACP (ADVANCE CARE PLANNING)
Palliative Care Progress Note  Palliative Care Admit date:  2/28/22    Advance Directives:  DNR-CC    Plan of care/goals:  Met w/ Marramsesa Push (dtr & mom, respectively) and they along w/ pts son, Jane Closs, support comfort measures for pt. They understand pt is at the end of her life and that pt was not wanting to pursue aggressive tests/procedures or life-prolongation. Although pt was staying @ moms home PTA, [facility] today says she does not believe that she can physically meet pts needs. More concerning, pts dtr, Nancy, has demonstrated being a barrier to pt receiving necessary care and pain mgt. We have discussed the hospice IPU upon d/c from hospital and, if deemed necessary, a long term facility w/ hospice making intermittent visits as they did @ home. Social/Spiritual:  Mom states pt \"knows [facility]\" and especially enjoys going to [facility]. They feel having prayer @ the [facility] w/  would be desired. Plan:  Comfort Measures.  to visit BS for pt and support family. HOC RN will meet w/ family today. Hospitalist will make attempt to reach [place] to address her concerns but, if they cannot connect, writer will remain available for [place].      ADDENDUM @ 1200:  Rec'd vmail from pts son, Jane Closs   He verb'd his goal that pt be kept comfortable and is supportive of moving forward w/ hospice. Stated he has been sick and that is the reason for the difficulty in reaching him yesterday.         Reason for consult:  ___ Advance Care Planning  _X_ Transition of Care Planning  _X__ Psychosocial/Spiritual Support  ___ Symptom Management                                                                                                                                                                                            Barrett Rivero RN

## 2022-03-01 NOTE — PROGRESS NOTES
RN pulled 5mg/5ml oral suspension of oxy for pt's pain control. RN took meds to the room. Pt refusing medication, closing lips anytime oral suspension brought to lips. RN wasted meds, witnessed by another RN.      Carly Norton RN     Witnessed by [employer]

## 2022-03-01 NOTE — PLAN OF CARE
Palliative Care Progress Note  Palliative Care Admit date:  2/28/22    Advance Directives:  DNR-CC now in place    Plan: Will be meeting cami/ Dimas Cat in pts room once pts mom arrives.   Anticipate ongoing discussions around care goals & preferred dispo        Reason for consult:  _X_ Advance Care Planning  ___ Transition of Care Planning  ___ Psychosocial/Spiritual Support  ___ Symptom Management                                                                                                                                                                                      Reinaldo Olmos RN

## 2022-03-01 NOTE — FLOWSHEET NOTE
Called to offer EOL support. Pt's mother in room, sharing stories about her [employer] experiences and about pt. Requested prayer,  offered prayer blanket and prayer at bedside. Spiritual care available to follow up as needed.

## 2022-03-01 NOTE — PLAN OF CARE
Palliative Care Progress Note  Palliative Care Admit date:  2/28/22    Advance Directives:  DNR-CC    Plan of care/goals:  Family has not yet met w/ HOC. Call to their office, informed that they will not be able to meet for admission until tomorrow. There are no available beds in the IPU today. [facility] adm RN will go speak w/ family to discuss meeting tomorrow for hospice adm. Currently, pt has prn Lorazepam and prn Roxicodone. Will need to observe her ability to adequately awaken to take po safely. Plan: have apprised shift RN that pt will not [facility] the [facility] today (no beds) and that hospice cannot admit until tomorrow; perhaps GIP. [facility] RN is going to explain to family.          Reason for consult:  ___ Advance Care Planning  _X_ Transition of Care Planning  ___ Psychosocial/Spiritual Support  ___ Symptom Management                                                                                                                                                            Rachael Aceves RN

## 2022-03-01 NOTE — PROGRESS NOTES
4 Eyes Skin Assessment     The patient is being assess for   Admission    I agree that 2 RN's have performed a thorough Head to Toe Skin Assessment on the patient. ALL assessment sites listed below have been assessed. Areas assessed for pressure by both nurses:   [x]   Head, Face, and Ears   [x]   Shoulders, Back, and Chest, Abdomen  [x]   Arms, Elbows, and Hands   [x]   Coccyx, Sacrum, and Ischium  [x]   Legs, Feet, and Heels        Skin Assessed Under all Medical Devices by both nurses:  O2 device tubing              All Mepilex Borders were peeled back and area peeked at by both nurses:  Yes  Please list where Mepilex Borders are located:  sacrum             **SHARE this note so that the co-signing nurse is able to place an eSignature**    Co-signer eSignature: Electronically signed by Henri Hawkins RN on 2/28/22 at 11:55 PM EST    Does the Patient have Skin Breakdown related to pressure?   Yes LDA WOUND CARE was Initiated documentation include the Dior-wound, Wound Assessment, Measurements, Dressing Treatment, Drainage, and Color\",              Kuldeep Prevention initiated:  Yes   Wound Care Orders initiated:  Yes      [address]  nurse consulted for Pressure Injury (Stage 3,4, Unstageable, DTI, NWPT, Complex wounds)and New or Established Ostomies:  No      Primary Nurse eSignature: Electronically signed by Reese Matson RN on 2/28/22 at 11:23 PM EST

## 2022-03-01 NOTE — PROGRESS NOTES
Physician Progress Note      PATIENT:               Adan Reeves  CSN #:                  [id number]  :                       1960  ADMIT DATE:       2022 1:31 PM  [address] [place] DATE:  RESPONDING  PROVIDER #:        Joni Pallas MD          QUERY TEXT:    Patient admitted with FTT 2/2 lung CA. If possible, please document in   progress notes and discharge summary if you are evaluating and /or treating   any of the following: The medical record reflects the following:    Risk Factors: lung CA    Clinical Indicators: on h/p exam pt is described as cachectic and  with   notable muscle wasting. on EMR review, pt was 103lbs at office visit dated   22, current encounter actual weight obtained 93 lbs. Treatment: IVF, labs and electrolyte replacement, supportive care, weights and   I/Os per nursing, current diet: adult easy to chew, GI bland    Thank you,  Ronnell Gibbs RN CDS  [email or web address]    ASPEN Criteria:    https://aspenjournals. onlinelibrary. shah. com/doi/full/10.1177/167216287474514  5  Options provided:  -- Protein calorie malnutrition severe  -- Protein calorie malnutrition moderate  -- Protein calorie malnutrition mild  -- Underweight with BMI 18.34  -- Other - I will add my own diagnosis  -- Disagree - Not applicable / Not valid  -- Disagree - Clinically unable to determine / Unknown  -- Refer to Clinical Documentation Reviewer    PROVIDER RESPONSE TEXT:    This patient has severe protein calorie malnutrition. Query created by:  Reynold Kiser on 3/1/2022 2:19 PM      Electronically signed by:  Joni Pallas MD 3/1/2022 2:45 PM

## 2022-03-01 NOTE — PROGRESS NOTES
Ps hospitalist,    \"Pt is adm for failure to thrive, came up to the floor with a sepsis score of 31.13, in the ED they got a lactic of 0.02 and I was notified they gave a liter bolus. Just letting you know! \". Waiting for response.

## 2022-03-01 NOTE — CARE COORDINATION
YESI met face to face with [facility] and updated that she will connect with the family now and establish a plan for dc possible to [facility]. Confirmed with BENJAMIN FITZGERALD/RN.  CM following-Olya Mcelroy RN

## 2022-03-01 NOTE — CONSULTS
Nephrology Consult Note                                                                                                                                                                                                                                                                                                                                                               Office : [phone]     Fax :[phone]              Patient's Name: Boo Chen  12:25 PM  3/1/2022    Reason for Consult:  Hypercalcemia      Requesting Physician:  Mary Ge MD      Chief Complaint:  Failure to thrive    History of Present Ilness:    Boo Chen is a 57 y.o. female with  PMH of Ca lung , was under hospice care  Admitted with c/o     Failure to thrive     Nephrology consult for hypercalcemia management    Per family patient has very poor appetite and very poor oral intake of food /fluids              Past Medical History:   Diagnosis Date    Abdominal pain     in ER Wed. 11/2  for Abdominal pain stool/showed blood.  Carpal tunnel syndrome     Hepatitis C     Hepatitis C     Hypertension     Pancreatitis     Scoliosis     SVT (supraventricular tachycardia) (HCC)        Past Surgical History:   Procedure Laterality Date    APPENDECTOMY      BACK SURGERY      r/t scoliosis    COLONOSCOPY      COLONOSCOPY  11/11    polyp    COLONOSCOPY  27 Jan 2016    polyps    ENDOSCOPIC ULTRASOUND (UPPER)  08/24/2016    Enlarged Pancreas    EYE SURGERY Right 2/5/15    Cataract removal    TUBAL LIGATION      UPPER GASTROINTESTINAL ENDOSCOPY  11/4/11    bx distal esophagus    UPPER GASTROINTESTINAL ENDOSCOPY N/A 4/12/2019    EGD WITH ANESTHESIA performed by Kaykay Sotomayor MD at [address]       Family History   Problem Relation Age of Onset    Cancer Father         small cell    Cancer Brother         small cell    Arthritis Mother     High Blood Pressure Mother         reports that she has quit smoking. CREATININE <0.5* <0.5*   GLUCOSE 90 88     Ca/Mg/Phos:   Recent Labs     02/28/22  1521 03/01/22  0023 03/01/22  0716   CALCIUM 16.1* 15.4* 16.2*   MG  --   --  1.20*     Hepatic:   Recent Labs     02/28/22  1521 03/01/22  0716   AST 56* 19   ALT 15 10   BILITOT 2.0* 2.2*   ALKPHOS 120 95     Troponin: No results for input(s): TROPONINI in the last 72 hours. BNP: No results for input(s): BNP in the last 72 hours. Lipids: No results for input(s): CHOL, TRIG, HDL, LDLCALC, LABVLDL in the last 72 hours. ABGs:   Recent Labs     03/01/22  0210   PHART 7.481*   PO2ART 77.8   IJF1FAW 45.0     INR: No results for input(s): INR in the last 72 hours. UA:  Recent Labs     02/28/22  1632   COLORU DARK YELLOW*   CLARITYU Clear   GLUCOSEU Negative   BILIRUBINUR Negative   KETUA Negative   SPECGRAV 1.025   BLOODU Negative   PHUR 5.5   PROTEINU Negative   UROBILINOGEN 1.0   NITRU Negative   LEUKOCYTESUR Negative   LABMICR Not Indicated   URINETYPE NotGiven      Urine Microscopic: No results for input(s): LABCAST, BACTERIA, COMU, HYALCAST, WBCUA, RBCUA, EPIU in the last 72 hours. Urine Culture: No results for input(s): LABURIN in the last 72 hours. Urine Chemistry: No results for input(s): Dorice Maid, PROTEINUR, NAUR in the last 72 hours. IMAGING:  CT HEAD WO CONTRAST   Final Result   No acute intracranial abnormality. XR CHEST PORTABLE   Final Result   Increasing consolidation in the right lung base. While this may be   artifactual due to rotation. Findings concerning for developing right   basilar pneumonia. RECOMMENDATION:   Follow-up PA and lateral with improved positioning recommended. XR CHEST PORTABLE   Final Result   Bilateral pulmonary masses compatible with known history of lung cancer. Assessment/Plan     1.  Hypercalcemia    Hypercalcemia due to malignancy     2 hypokalemia    3 Hyponatremia    4 Hypomagnesemia    5 Failure to thrive    6 squamous cell lung Ca    Plan    Give IVF  ml/hr    Give Calcitonin x 4 doses total  Replace Mg , K prn per protocol    Follow up palliative/ hospice consult and goal of care       Had discussion with hospitalist , will sign off for now     Please call back if needed              Thank you for allowing us to participate in care of Melia Plummer MD  Feel free to contact me   [facility] [address]  Office : [phone]  Fax :[phone]

## 2022-03-01 NOTE — PROGRESS NOTES
RN messaged provider \"Critical value - Pt's venous blood pH is 7.602 from 3/1 at 0715 this morning. \"    Isaac Locke, RADHA

## 2022-03-01 NOTE — ED NOTES
Mother asked if pt can have her home dose of pain meds. Verified with Dr Ren Peng that it is ok.      [facility]  02/28/22 1930

## 2022-03-02 VITALS
WEIGHT: 93.92 LBS | HEIGHT: 60 IN | TEMPERATURE: 97.8 F | BODY MASS INDEX: 18.44 KG/M2 | RESPIRATION RATE: 12 BRPM | HEART RATE: 109 BPM | SYSTOLIC BLOOD PRESSURE: 99 MMHG | DIASTOLIC BLOOD PRESSURE: 64 MMHG | OXYGEN SATURATION: 93 %

## 2022-03-02 PROCEDURE — 6360000002 HC RX W HCPCS: Performed by: HOSPITALIST

## 2022-03-02 PROCEDURE — 94761 N-INVAS EAR/PLS OXIMETRY MLT: CPT

## 2022-03-02 PROCEDURE — 6370000000 HC RX 637 (ALT 250 FOR IP): Performed by: INTERNAL MEDICINE

## 2022-03-02 PROCEDURE — 2580000003 HC RX 258: Performed by: HOSPITALIST

## 2022-03-02 PROCEDURE — 2700000000 HC OXYGEN THERAPY PER DAY

## 2022-03-02 RX ADMIN — CALCITONIN SALMON 200 UNITS: 200 INJECTION, SOLUTION INTRAMUSCULAR; SUBCUTANEOUS at 09:06

## 2022-03-02 RX ADMIN — OXYCODONE HYDROCHLORIDE 5 MG: 5 SOLUTION ORAL at 11:10

## 2022-03-02 RX ADMIN — OXYCODONE HYDROCHLORIDE 5 MG: 5 SOLUTION ORAL at 13:37

## 2022-03-02 RX ADMIN — OXYCODONE HYDROCHLORIDE 5 MG: 5 SOLUTION ORAL at 04:11

## 2022-03-02 RX ADMIN — SODIUM CHLORIDE, PRESERVATIVE FREE 10 ML: 5 INJECTION INTRAVENOUS at 09:12

## 2022-03-02 ASSESSMENT — PAIN SCALES - GENERAL
PAINLEVEL_OUTOF10: 4
PAINLEVEL_OUTOF10: 6
PAINLEVEL_OUTOF10: 5

## 2022-03-02 NOTE — PROGRESS NOTES
Physician Progress Note      PATIENT:               Joyce Cheung  CSN #:                  [id number]  :                       1960  ADMIT DATE:       2022 1:31 PM  [address] [place] DATE:  RESPONDING  PROVIDER #:        Kathi Vanessa MD          QUERY TEXT:    Pt admitted with FTT due to end stage cancer and has encephalopathy   documented. If possible, please document in progress notes and discharge   summary further specificity regarding the type of encephalopathy:    The medical record reflects the following:    Risk Factors: failure to thrive due to end stage cancer    Clinical Indicators: per h/p - acute encephalopathy. ..-Suspect that patient's   acute encephalopathy and GI motility issues are secondary to this electrolyte   disturbance    per HPI on h/p: At the time of examination no family members are present   therefore HPI is limited in this individual who is acutely altered and   struggling to speak. Hospice caregiver reports a 1 week history of increasing   lethargy, decreased p.o. intake, dysphagia, and anarthria. CT head negative for acute findings    Treatment: electrolyte correction and IVF resuscitation 30 ml/kg, nephrology   consult, calcitonin, supportive care, serial labs and monitoring    Thank you,  [email or web address]  Options provided:  -- Metabolic encephalopathy  -- Toxic metabolic encephalopathy  -- Other - I will add my own diagnosis  -- Disagree - Not applicable / Not valid  -- Disagree - Clinically unable to determine / Unknown  -- Refer to Clinical Documentation Reviewer    PROVIDER RESPONSE TEXT:    This patient has metabolic encephalopathy. Query created by:  Fernie Moctezuma on 3/2/2022 10:13 AM      Electronically signed by:  Kathi Vanessa MD 3/2/2022 11:47 AM

## 2022-03-02 NOTE — DISCHARGE SUMMARY
Hospital Medicine Discharge Summary    Patient ID: Giovanni Sultana      Patient's PCP: Juan Villegas MD    Admit Date: 2/28/2022     Discharge Date:   3/2/2022    Admitting Provider: Chela Lu MD     Discharge Provider: ROMI Lester - CNP     Discharge Diagnoses: Active Hospital Problems    Diagnosis     Severe protein-calorie malnutrition Patti Acevedo: less than 60% of standard weight) ([facility] [E43]     Squamous cell lung cancer ([facility] [C34.90]     FTT (failure to thrive) in adult [R62.7]     Cancer cachexia ([facility] [R64]     Hypercalcemia [E83.52]     Hypoalbuminemia [E88.09]     Acute on chronic anemia [D64.9]     Chronic [facility] virus [facility] ([facility] [B18.2]     Tobacco dependence syndrome [F17.200]        The patient was seen and examined on day of discharge and this discharge summary is in conjunction with any daily progress note from day of discharge. Hospital Course:   62yo WF with PMHx sig for hep C and metastatic squamous cell cancer presents from home hospice due to pain and not eating over past 4 days. Pt requested to go to the hospital. She is having difficulty with swallowing and talking. She has had increased lethargy, unable to eat and pt was dx just before [place] with invasive poorly differentiated lung cancer for which she declined further treatment. She entered hospice on December 30. She is [facility] and that is confirmed with family. Pt does have significant electrolyte abnormality due to malnutrition and terminal metastatic cancer. 1.  Failure to thrive - due to end stage cancer. Continue comfort measures. Plan for hospice IPU at discharge. 2.  Hypercalcemia - on IVF and calcitonin x 4 doses - this is a band aid as the underlying cause is metastatic lung cancer - have discussed with nephrology and they signed off. 3.  This patient meets with criteria for  severe malnutrution based on a BMI of  Body mass index is 18.34 kg/m².  This malnutrition is likely due to cancer . 4. Hypokalemia - recommend comfort measures at this time and will not continue to check labs. 5.  M[place]ola is appropriate due to hospice appropriate status. 6. Metastatic squamous cell cancer - pt is end stage and appears to be actively dying - will keep comfortable. Physical Exam Performed:     BP 99/64   Pulse 109   Temp 97.8 °F (36.6 °C) (Tympanic)   Resp 12   Ht 5' (1.524 m)   Wt 93 lb 14.7 oz (42.6 kg)   SpO2 93%   BMI 18.34 kg/m²       General appearance:  Muscular wasting, ill appearing. HEENT:  Normal cephalic, atraumatic without obvious deformity. Pupils equal, round, and reactive to light. Extra ocular muscles intact. Conjunctivae/corneas clear. Neck: Supple, with full range of motion. No jugular venous distention. Trachea midline. Respiratory:  Normal respiratory effort. Clear to auscultation, bilaterally without Rales/Wheezes/Rhonchi. Cardiovascular:  Regular rate and rhythm with normal S1/S2 without murmurs, rubs or gallops. Abdomen: Soft, non-tender, non-distended with normal bowel sounds. Musculoskeletal:  No clubbing, cyanosis or edema bilaterally. Full range of motion without deformity. Skin: Skin color, texture, turgor normal.  No rashes or lesions. Neurologic:  Neurovascularly intact without any focal sensory/motor deficits. Cranial nerves: II-XII intact, grossly non-focal. Profoundly weak. Psychiatric:  Alert and oriented  Capillary Refill: Brisk,< 3 seconds   Peripheral Pulses: +2 palpable, equal bilaterally       Labs:  For convenience and continuity at follow-up the following most recent labs are provided:      CBC:    Lab Results   Component Value Date    WBC 15.3 03/01/2022    HGB 7.9 03/01/2022    HCT 26.0 03/01/2022     03/01/2022       Renal:    Lab Results   Component Value Date     03/01/2022    K 2.9 03/01/2022    CL 90 03/01/2022    CO2 29 03/01/2022    BUN 23 03/01/2022    CREATININE <0.5 03/01/2022    CALCIUM 16.2 03/01/2022    PHOS 3.3 04/29/2019         Significant Diagnostic Studies    Radiology:   CT HEAD WO CONTRAST   Final Result   No acute intracranial abnormality. XR CHEST PORTABLE   Final Result   Increasing consolidation in the right lung base. While this may be   artifactual due to rotation. Findings concerning for developing right   basilar pneumonia. RECOMMENDATION:   Follow-up PA and lateral with improved positioning recommended. XR CHEST PORTABLE   Final Result   Bilateral pulmonary masses compatible with known history of lung cancer. Consults:     IP CONSULT TO PALLIATIVE CARE  IP CONSULT TO HOSPITALIST  IP CONSULT TO NEPHROLOGY  IP CONSULT TO HOSPICE  IP CONSULT TO PALLIATIVE CARE  IP CONSULT TO HOSPICE    Disposition:  Inpatient hospice     Condition at Discharge: Stable    Discharge Instructions/Follow-up:  Not applicable     Code Status:  DNR-CC     Activity: activity as tolerated    Diet: regular diet      Discharge Medications:     Current Discharge Medication List           Details   pantoprazole (PROTONIX) 40 MG tablet Take 1 tablet by mouth every morning (before breakfast)  Qty: 30 tablet, Refills: 3      albuterol sulfate  (90 Base) MCG/ACT inhaler Inhale 2 puffs into the lungs every 6 hours as needed for Wheezing      Multiple Vitamins-Minerals (THERAPEUTIC MULTIVITAMIN-MINERALS) tablet Take 1 tablet by mouth daily. Time Spent on discharge is more than 45 minutes in the examination, evaluation, counseling and review of medications and discharge plan. Signed:    ROMI Cavazos - WOODY   3/2/2022      Thank you Timothy Higginbotham MD for the opportunity to be involved in this patient's care. If you have any questions or concerns please feel free to contact me at [phone].

## 2022-03-02 NOTE — DISCHARGE INSTR - COC
Continuity of Care Form    Patient Name: Dimitrios Urbina   :  1960  MRN:  [id number]    Admit date:  2022  Discharge date:  ***    Code Status Order: DNR-CC   Advance Directives:      Admitting Physician:  Catana Fleischer, MD  PCP: Blanche Juárez MD    Discharging Nurse: [facility] Unit/Room#: 9969/1566-96  Discharging Unit Phone Number: ***    Emergency Contact:   Extended Emergency Contact Information  Primary Emergency Contact: Dayanna Beckman  Address: [address] Phone: [phone]  Work Phone: [phone]  Mobile Phone: [phone]  Relation: Parent  Secondary Emergency Contact: Hermelindo Anderson  Mobile Phone: [phone]  Relation: Child  Preferred language: English   needed?  No    Past Surgical History:  Past Surgical History:   Procedure Laterality Date    APPENDECTOMY      BACK SURGERY      r/t scoliosis    COLONOSCOPY      COLONOSCOPY      polyp    COLONOSCOPY  2016    polyps    ENDOSCOPIC ULTRASOUND (UPPER)  2016    Enlarged Pancreas    EYE SURGERY Right 2/5/15    Cataract removal    TUBAL LIGATION      UPPER GASTROINTESTINAL ENDOSCOPY  11    bx distal esophagus    UPPER GASTROINTESTINAL ENDOSCOPY N/A 2019    EGD WITH ANESTHESIA performed by Christa Neri MD at [address]       Immunization History:   Immunization History   Administered Date(s) Administered    Tdap (Boostrix, Adacel) 2015       Active Problems:  Patient Active Problem List   Diagnosis Code    Acute pancreatitis K85.90    N&V (nausea and vomiting) R11.2    Abdominal pain R10.9    Transaminitis R74.01    Rash, drug L27.0    Hyponatremia E87.1    Chronic hepatitis C virus infection (HCC) B18.2    Hypertension I10    Tobacco dependence syndrome F17.200    Squamous cell lung cancer (HCC) C34.90    FTT (failure to thrive) in adult R62.7    Alcohol dependence, daily use (HCC) F10.20    Cancer cachexia ([facility] R64    Hypercalcemia E83.52    Hypoalbuminemia E88.09    Acute on chronic anemia D64.9    Severe protein-calorie malnutrition [place]: less than 60% of standard weight) ([facility]) E43       Isolation/Infection:   Isolation            No Isolation          Patient Infection Status       None to display            Nurse Assessment:  Last Vital Signs: BP 99/64   Pulse 109   Temp 97.8 °F (36.6 °C) (Tympanic)   Resp 12   Ht 5' (1.524 m)   Wt 93 lb 14.7 oz (42.6 kg)   SpO2 93%   BMI 18.34 kg/m²     Last documented pain score (0-10 scale): Pain Level: 5  Last Weight:   Wt Readings from Last 1 Encounters:   03/01/22 93 lb 14.7 oz (42.6 kg)     Mental Status:   Responds to voice    IV Access:  - ***    Nursing Mobility/ADLs:  Walking   Dependent  Transfer  Dependent  Bathing  Dependent  Dressing  Dependent  Toileting  Dependent  Feeding  Dependent  Med Admin  Dependent  Med Delivery    ***    Wound Care Documentation and Therapy:  Wound 02/28/22 Coccyx Medial (Active)   Dressing Status Clean;Dry; Intact 03/02/22 0910   Dressing/Treatment Adhesive bandage 03/02/22 0910   Number of days: 1        Elimination:  Continence: Bowel: no  Bladder: no  Urinary Catheter: Insertion Date: 2/28    Colostomy/Ileostomy/Ileal Conduit: No       Date of Last BM: ***    Intake/Output Summary (Last 24 hours) at 3/2/2022 1139  Last data filed at 3/1/2022 2359  Gross per 24 hour   Intake --   Output 1000 ml   Net -1000 ml     I/O last 3 completed shifts:  In: -   Out: 1900 [Urine:1900]    Safety Concerns:      At Risk for Falls    Impairments/Disabilities:      ***    Nutrition Therapy:  Current Nutrition Therapy:   - Oral Diet:  {MH LORAINE Oral EXJR:[id number]}  - Easy Chew/GI [place]  (GERD, Peptic Ulcer)    Routes of Feeding: Oral  Liquids: No Restrictions  Daily Fluid Restriction: no  Last Modified Barium Swallow with Video (Video Swallowing Test):     Treatments at the Time of Hospital Discharge:   Respiratory Treatments: ***  Oxygen Therapy:   5 liters  Ventilator:    - No ventilator support    Rehab Therapies: ***  Weight Bearing Status/Restrictions: No weight bearing restirctions  Other Medical Equipment (for information only, NOT a DME order):  ***  Other Treatments: ***    Patient's personal belongings (please select all that are sent with patient):  Pt taken with all belonging    RN SIGNATURE:  Electronically signed by Juan Lynn RN on 3/2/22 at 11:45 AM EST    CASE MANAGEMENT/SOCIAL WORK SECTION    Inpatient Status Date: ***    Readmission Risk Assessment Score:  Readmission Risk              Risk of Unplanned Readmission:  15           Discharging to Facility/ Agency   Name:   Address:  Phone:  Fax:    Dialysis Facility (if applicable)   Name:  Address:  Dialysis Schedule:  Phone:  Fax:    / signature: {Esignature:[id number]}    PHYSICIAN SECTION    Prognosis: Poor    Condition at Discharge: Terminal    Rehab Potential (if transferring to Rehab): N/A    Recommended Labs or Other Treatments After Discharge: N/A    Physician Certification: I certify the above information and transfer of Blair Lanes  is necessary for the continuing treatment of the diagnosis listed and that she requires Hospice for less 30 days.      Update Admission H&P: No change in H&P    PHYSICIAN SIGNATURE:  Electronically signed by ROMI Deleon CNP on 3/2/22 at 11:57 AM EST

## 2022-03-02 NOTE — PLAN OF CARE
Palliative Care Progress Note  Palliative Care Admit date:  2/28/22    Advance Directives:  DNR-CC in place    Plan of care/goals:  Call to [facility] to confirm that Adm RN will be meeting today w/ family. Unsure of time at this moment. Also, will move pt to [facility] pending bed availability.     Plan:  Family to meet w/ [facility]        Reason for consult:  ___ Advance Care Planning  _X_Transition of Care Planning  ___ Psychosocial/Spiritual Support  ___ Symptom Management                                                                                                                                                                                                  Rachael Aceves RN

## 2022-03-02 NOTE — CARE COORDINATION
CM spoke to [facility] face to face and she is arranging admission today to the [place] [id number], consent sighned with family at bedside and she will get back to writer with updates as they progress.  CM following-Olya Mcelroy RN

## 2022-03-02 NOTE — CARE COORDINATION
Case Management Discharge Summary- Hospice Discharge    Destination:   Hospice [facility]. Hospice Agency name and contact: [facility] arrangements are completed by the Hospice nurse. [facility] DNR signed and placed in discharge packet AND patient's hard chart. (This is required for DNR patients.)    Signed ECOC/AVS faxed to above agency/facility. Transportation arrangements per [employer]. Transport agency name and  time:     Transport form completed and signed by:  Myself  Transport form placed with discharge packet: Yes    Notified Family: Curly Sullivan met with family at bedside today all in agreement. Contact name: Sandra Vega    Patient's RN notified of plan: updated Frankie Soares RN     Note:    Discharging nurse to complete nursing portion of ECOC, reconcile AVS, place final copy with patient's discharge packet. RN to ensure signed prescriptions are sent home with patient or the facility as per nursing protocol.

## 2022-03-04 LAB — CALCITONIN LEVEL: 2.1 PG/ML (ref 0–5.1)

## 2022-03-13 LAB — PTH RELATED PEPTIDE: 56.2 PMOL/L (ref 0–3.4)

## 2024-01-03 NOTE — DISCHARGE INSTR - DIET

## (undated) DEVICE — Device: Brand: DEFENDO VALVE AND CONNECTOR KIT

## (undated) DEVICE — Z DISCONTINUED USE 2276105 GOWN PROTCT UNIV CHST W28IN L49IN SL 24IN BLU SPUNBOND FLM

## (undated) DEVICE — ELECTRODE,ECG,STRESS,FOAM,3PK: Brand: MEDLINE

## (undated) DEVICE — AIRLIFE™ NASAL OXYGEN CANNULA CURVED, FLARED TIP, WITH 7 FEET (2.1 M) CRUSH RESISTANT TUBING, OVER-THE-EAR STYLE: Brand: AIRLIFE™

## (undated) DEVICE — CANNULA NSL AD L7FT DIV O2 CO2 W/ M LUERLOCK TRMPT CONN

## (undated) DEVICE — ENDO CARRY-ON PROCEDURE KIT INCLUDES SUCTION TUBING, LUBRICANT, GAUZE, BIOHAZARD STICKER, TRANSPORT PAD AND INTERCEPT BEDSIDE KIT.: Brand: ENDO CARRY-ON PROCEDURE KIT

## (undated) DEVICE — CONMED SCOPE SAVER BITE BLOCK, 20X27 MM: Brand: SCOPE SAVER